# Patient Record
Sex: MALE | Race: WHITE | Employment: FULL TIME | URBAN - NONMETROPOLITAN AREA
[De-identification: names, ages, dates, MRNs, and addresses within clinical notes are randomized per-mention and may not be internally consistent; named-entity substitution may affect disease eponyms.]

---

## 2017-05-09 ENCOUNTER — HOSPITAL ENCOUNTER (OUTPATIENT)
Age: 47
Setting detail: SPECIMEN
Discharge: HOME | End: 2017-05-09

## 2017-05-09 LAB
ALBUMIN SERPL-MCNC: 4.7 G/DL (ref 3.5–5.2)
ALBUMIN/GLOBULIN RATIO: 1.5 (ref 1–2.5)
ALP BLD-CCNC: 61 U/L (ref 40–129)
ALT SERPL-CCNC: 34 U/L (ref 5–41)
ANION GAP SERPL CALCULATED.3IONS-SCNC: 15 MMOL/L (ref 9–17)
AST SERPL-CCNC: 28 U/L
BILIRUB SERPL-MCNC: 0.56 MG/DL (ref 0.3–1.2)
BUN BLDV-MCNC: 23 MG/DL (ref 6–20)
BUN/CREAT BLD: 27 (ref 9–20)
CALCIUM SERPL-MCNC: 9.6 MG/DL (ref 8.6–10.4)
CHLORIDE BLD-SCNC: 97 MMOL/L (ref 98–107)
CHOLESTEROL/HDL RATIO: 2
CHOLESTEROL: 208 MG/DL
CO2: 27 MMOL/L (ref 20–31)
CREAT SERPL-MCNC: 0.85 MG/DL (ref 0.7–1.2)
GFR AFRICAN AMERICAN: >60 ML/MIN
GFR NON-AFRICAN AMERICAN: >60 ML/MIN
GFR SERPL CREATININE-BSD FRML MDRD: ABNORMAL ML/MIN/{1.73_M2}
GFR SERPL CREATININE-BSD FRML MDRD: ABNORMAL ML/MIN/{1.73_M2}
GLUCOSE BLD-MCNC: 117 MG/DL (ref 70–99)
HCT VFR BLD CALC: 47.6 % (ref 41–53)
HDLC SERPL-MCNC: 106 MG/DL
HEMOGLOBIN: 15.7 G/DL (ref 13.5–17)
LDL CHOLESTEROL: 96 MG/DL (ref 0–130)
MCH RBC QN AUTO: 30.6 PG (ref 26–34)
MCHC RBC AUTO-ENTMCNC: 33 G/DL (ref 31–37)
MCV RBC AUTO: 92.9 FL (ref 80–100)
PDW BLD-RTO: 14.3 % (ref 12.1–15.2)
PLATELET # BLD: 356 K/UL (ref 140–450)
PMV BLD AUTO: NORMAL FL (ref 6–12)
POTASSIUM SERPL-SCNC: 4.2 MMOL/L (ref 3.7–5.3)
RBC # BLD: 5.12 M/UL (ref 4.5–5.9)
SODIUM BLD-SCNC: 139 MMOL/L (ref 135–144)
TOTAL PROTEIN: 7.8 G/DL (ref 6.4–8.3)
TRIGL SERPL-MCNC: 32 MG/DL
VLDLC SERPL CALC-MCNC: ABNORMAL MG/DL (ref 1–30)
WBC # BLD: 4.9 K/UL (ref 3.5–11)

## 2017-05-09 PROCEDURE — 80053 COMPREHEN METABOLIC PANEL: CPT

## 2017-05-09 PROCEDURE — 85027 COMPLETE CBC AUTOMATED: CPT

## 2017-05-09 PROCEDURE — 80061 LIPID PANEL: CPT

## 2017-05-09 PROCEDURE — G0328 FECAL BLOOD SCRN IMMUNOASSAY: HCPCS

## 2017-05-12 LAB
DATE, STOOL #1: NORMAL
DATE, STOOL #2: NORMAL
DATE, STOOL #3: NORMAL
HEMOCCULT SP1 STL QL: NEGATIVE
HEMOCCULT SP2 STL QL: NORMAL
HEMOCCULT SP3 STL QL: NORMAL
TIME, STOOL #1: NORMAL
TIME, STOOL #2: NORMAL
TIME, STOOL #3: NORMAL

## 2018-05-01 ENCOUNTER — HOSPITAL ENCOUNTER (OUTPATIENT)
Age: 48
Setting detail: SPECIMEN
Discharge: HOME | End: 2018-05-01
Payer: COMMERCIAL

## 2018-05-01 LAB
ALBUMIN SERPL-MCNC: 4.7 G/DL (ref 3.5–5.2)
ALBUMIN/GLOBULIN RATIO: 1.7 (ref 1–2.5)
ALP BLD-CCNC: 57 U/L (ref 40–129)
ALT SERPL-CCNC: 27 U/L (ref 5–41)
ANION GAP SERPL CALCULATED.3IONS-SCNC: 11 MMOL/L (ref 9–17)
AST SERPL-CCNC: 26 U/L
BILIRUB SERPL-MCNC: 0.87 MG/DL (ref 0.3–1.2)
BUN BLDV-MCNC: 24 MG/DL (ref 6–20)
BUN/CREAT BLD: 28 (ref 9–20)
CALCIUM SERPL-MCNC: 9.6 MG/DL (ref 8.6–10.4)
CHLORIDE BLD-SCNC: 97 MMOL/L (ref 98–107)
CHOLESTEROL/HDL RATIO: 2.1
CHOLESTEROL: 177 MG/DL
CO2: 25 MMOL/L (ref 20–31)
CREAT SERPL-MCNC: 0.86 MG/DL (ref 0.7–1.2)
DATE, STOOL #1: NORMAL
DATE, STOOL #2: NORMAL
DATE, STOOL #3: NORMAL
GFR AFRICAN AMERICAN: >60 ML/MIN
GFR NON-AFRICAN AMERICAN: >60 ML/MIN
GFR SERPL CREATININE-BSD FRML MDRD: ABNORMAL ML/MIN/{1.73_M2}
GFR SERPL CREATININE-BSD FRML MDRD: ABNORMAL ML/MIN/{1.73_M2}
GLUCOSE BLD-MCNC: 108 MG/DL (ref 70–99)
HCT VFR BLD CALC: 46 % (ref 40.7–50.3)
HDLC SERPL-MCNC: 83 MG/DL
HEMOCCULT SP1 STL QL: NEGATIVE
HEMOCCULT SP2 STL QL: NORMAL
HEMOCCULT SP3 STL QL: NORMAL
HEMOGLOBIN: 15.9 G/DL (ref 13–17)
LDL CHOLESTEROL: 84 MG/DL (ref 0–130)
MCH RBC QN AUTO: 31.7 PG (ref 25.2–33.5)
MCHC RBC AUTO-ENTMCNC: 34.6 G/DL (ref 28.4–34.8)
MCV RBC AUTO: 91.6 FL (ref 82.6–102.9)
NRBC AUTOMATED: 0 PER 100 WBC
PDW BLD-RTO: 12.3 % (ref 11.8–14.4)
PLATELET # BLD: 328 K/UL (ref 138–453)
PMV BLD AUTO: 9.3 FL (ref 8.1–13.5)
POTASSIUM SERPL-SCNC: 4 MMOL/L (ref 3.7–5.3)
PROSTATE SPECIFIC ANTIGEN: 0.66 UG/L
RBC # BLD: 5.02 M/UL (ref 4.21–5.77)
SODIUM BLD-SCNC: 133 MMOL/L (ref 135–144)
TIME, STOOL #1: NORMAL
TIME, STOOL #2: NORMAL
TIME, STOOL #3: NORMAL
TOTAL PROTEIN: 7.4 G/DL (ref 6.4–8.3)
TRIGL SERPL-MCNC: 49 MG/DL
VLDLC SERPL CALC-MCNC: NORMAL MG/DL (ref 1–30)
WBC # BLD: 5 K/UL (ref 3.5–11.3)

## 2018-05-01 PROCEDURE — 80061 LIPID PANEL: CPT

## 2018-05-01 PROCEDURE — 80053 COMPREHEN METABOLIC PANEL: CPT

## 2018-05-01 PROCEDURE — G0103 PSA SCREENING: HCPCS

## 2018-05-01 PROCEDURE — 82272 OCCULT BLD FECES 1-3 TESTS: CPT

## 2018-05-01 PROCEDURE — 85027 COMPLETE CBC AUTOMATED: CPT

## 2019-04-23 ENCOUNTER — HOSPITAL ENCOUNTER (OUTPATIENT)
Age: 49
Setting detail: SPECIMEN
Discharge: HOME | End: 2019-04-23
Payer: COMMERCIAL

## 2019-04-23 LAB
ALBUMIN SERPL-MCNC: 4.6 G/DL (ref 3.5–5.2)
ALBUMIN/GLOBULIN RATIO: 1.7 (ref 1–2.5)
ALP BLD-CCNC: 60 U/L (ref 40–129)
ALT SERPL-CCNC: 21 U/L (ref 5–41)
ANION GAP SERPL CALCULATED.3IONS-SCNC: 9 MMOL/L (ref 9–17)
AST SERPL-CCNC: 24 U/L
BILIRUB SERPL-MCNC: 0.27 MG/DL (ref 0.3–1.2)
BUN BLDV-MCNC: 25 MG/DL (ref 6–20)
BUN/CREAT BLD: 29 (ref 9–20)
CALCIUM SERPL-MCNC: 9.6 MG/DL (ref 8.6–10.4)
CHLORIDE BLD-SCNC: 103 MMOL/L (ref 98–107)
CHOLESTEROL/HDL RATIO: 2.2
CHOLESTEROL: 153 MG/DL
CO2: 27 MMOL/L (ref 20–31)
CREAT SERPL-MCNC: 0.86 MG/DL (ref 0.7–1.2)
DATE, STOOL #1: NORMAL
DATE, STOOL #2: NORMAL
DATE, STOOL #3: NORMAL
GFR AFRICAN AMERICAN: >60 ML/MIN
GFR NON-AFRICAN AMERICAN: >60 ML/MIN
GFR SERPL CREATININE-BSD FRML MDRD: ABNORMAL ML/MIN/{1.73_M2}
GFR SERPL CREATININE-BSD FRML MDRD: ABNORMAL ML/MIN/{1.73_M2}
GLUCOSE BLD-MCNC: 100 MG/DL (ref 70–99)
HCT VFR BLD CALC: 45.9 % (ref 40.7–50.3)
HDLC SERPL-MCNC: 70 MG/DL
HEMOCCULT SP1 STL QL: NEGATIVE
HEMOCCULT SP2 STL QL: NORMAL
HEMOCCULT SP3 STL QL: NORMAL
HEMOGLOBIN: 15.1 G/DL (ref 13–17)
LDL CHOLESTEROL: 76 MG/DL (ref 0–130)
MCH RBC QN AUTO: 30.8 PG (ref 25.2–33.5)
MCHC RBC AUTO-ENTMCNC: 32.9 G/DL (ref 28.4–34.8)
MCV RBC AUTO: 93.5 FL (ref 82.6–102.9)
NRBC AUTOMATED: 0 PER 100 WBC
PDW BLD-RTO: 12.6 % (ref 11.8–14.4)
PLATELET # BLD: 333 K/UL (ref 138–453)
PMV BLD AUTO: 9.5 FL (ref 8.1–13.5)
POTASSIUM SERPL-SCNC: 4.3 MMOL/L (ref 3.7–5.3)
RBC # BLD: 4.91 M/UL (ref 4.21–5.77)
SODIUM BLD-SCNC: 139 MMOL/L (ref 135–144)
TIME, STOOL #1: 800
TIME, STOOL #2: NORMAL
TIME, STOOL #3: NORMAL
TOTAL PROTEIN: 7.3 G/DL (ref 6.4–8.3)
TRIGL SERPL-MCNC: 33 MG/DL
VLDLC SERPL CALC-MCNC: NORMAL MG/DL (ref 1–30)
WBC # BLD: 5.2 K/UL (ref 3.5–11.3)

## 2019-04-23 PROCEDURE — 85027 COMPLETE CBC AUTOMATED: CPT

## 2019-04-23 PROCEDURE — 80053 COMPREHEN METABOLIC PANEL: CPT

## 2019-04-23 PROCEDURE — 80061 LIPID PANEL: CPT

## 2019-04-23 PROCEDURE — 82272 OCCULT BLD FECES 1-3 TESTS: CPT

## 2019-04-29 ENCOUNTER — HOSPITAL ENCOUNTER (OUTPATIENT)
Age: 49
Discharge: HOME | End: 2019-04-29

## 2019-04-29 ENCOUNTER — HOSPITAL ENCOUNTER (OUTPATIENT)
Dept: GENERAL RADIOLOGY | Age: 49
Discharge: HOME | End: 2019-05-01

## 2019-04-29 ENCOUNTER — HOSPITAL ENCOUNTER (OUTPATIENT)
Age: 49
Discharge: HOME | End: 2019-05-01

## 2019-04-29 DIAGNOSIS — Z00.00 PHYSICAL EXAM: ICD-10-CM

## 2019-04-29 PROCEDURE — 71045 X-RAY EXAM CHEST 1 VIEW: CPT

## 2019-04-29 PROCEDURE — 93005 ELECTROCARDIOGRAM TRACING: CPT

## 2019-04-30 LAB
EKG ATRIAL RATE: 49 BPM
EKG P AXIS: 56 DEGREES
EKG P-R INTERVAL: 160 MS
EKG Q-T INTERVAL: 436 MS
EKG QRS DURATION: 100 MS
EKG QTC CALCULATION (BAZETT): 393 MS
EKG R AXIS: 55 DEGREES
EKG T AXIS: 32 DEGREES
EKG VENTRICULAR RATE: 49 BPM

## 2020-07-21 ENCOUNTER — HOSPITAL ENCOUNTER (OUTPATIENT)
Age: 50
Setting detail: SPECIMEN
Discharge: HOME | End: 2020-07-21
Payer: COMMERCIAL

## 2020-07-21 ENCOUNTER — HOSPITAL ENCOUNTER (OUTPATIENT)
Age: 50
Discharge: HOME | End: 2020-07-21
Payer: COMMERCIAL

## 2020-07-21 LAB
ALBUMIN SERPL-MCNC: 4.8 G/DL (ref 3.5–5.2)
ALBUMIN/GLOBULIN RATIO: 1.7 (ref 1–2.5)
ALP BLD-CCNC: 52 U/L (ref 40–129)
ALT SERPL-CCNC: 27 U/L (ref 5–41)
ANION GAP SERPL CALCULATED.3IONS-SCNC: 10 MMOL/L (ref 9–17)
AST SERPL-CCNC: 33 U/L
BILIRUB SERPL-MCNC: 0.86 MG/DL (ref 0.3–1.2)
BUN BLDV-MCNC: 21 MG/DL (ref 6–20)
BUN/CREAT BLD: 26 (ref 9–20)
CALCIUM SERPL-MCNC: 9.7 MG/DL (ref 8.6–10.4)
CHLORIDE BLD-SCNC: 98 MMOL/L (ref 98–107)
CHOLESTEROL/HDL RATIO: 2
CHOLESTEROL: 190 MG/DL
CO2: 26 MMOL/L (ref 20–31)
CREAT SERPL-MCNC: 0.8 MG/DL (ref 0.7–1.2)
GFR AFRICAN AMERICAN: >60 ML/MIN
GFR NON-AFRICAN AMERICAN: >60 ML/MIN
GFR SERPL CREATININE-BSD FRML MDRD: ABNORMAL ML/MIN/{1.73_M2}
GFR SERPL CREATININE-BSD FRML MDRD: ABNORMAL ML/MIN/{1.73_M2}
GLUCOSE BLD-MCNC: 97 MG/DL (ref 70–99)
HCT VFR BLD CALC: 46.3 % (ref 40.7–50.3)
HDLC SERPL-MCNC: 96 MG/DL
HEMOGLOBIN: 15.3 G/DL (ref 13–17)
LDL CHOLESTEROL: 87 MG/DL (ref 0–130)
MCH RBC QN AUTO: 31.6 PG (ref 25.2–33.5)
MCHC RBC AUTO-ENTMCNC: 33 G/DL (ref 28.4–34.8)
MCV RBC AUTO: 95.7 FL (ref 82.6–102.9)
NRBC AUTOMATED: 0 PER 100 WBC
PDW BLD-RTO: 13 % (ref 11.8–14.4)
PLATELET # BLD: 330 K/UL (ref 138–453)
PMV BLD AUTO: 9.2 FL (ref 8.1–13.5)
POTASSIUM SERPL-SCNC: 4.1 MMOL/L (ref 3.7–5.3)
RBC # BLD: 4.84 M/UL (ref 4.21–5.77)
SODIUM BLD-SCNC: 134 MMOL/L (ref 135–144)
TOTAL PROTEIN: 7.7 G/DL (ref 6.4–8.3)
TRIGL SERPL-MCNC: 37 MG/DL
VLDLC SERPL CALC-MCNC: NORMAL MG/DL (ref 1–30)
WBC # BLD: 5.5 K/UL (ref 3.5–11.3)

## 2020-07-21 PROCEDURE — 85027 COMPLETE CBC AUTOMATED: CPT

## 2020-07-21 PROCEDURE — 80053 COMPREHEN METABOLIC PANEL: CPT

## 2020-07-21 PROCEDURE — 80061 LIPID PANEL: CPT

## 2020-07-21 PROCEDURE — 93005 ELECTROCARDIOGRAM TRACING: CPT | Performed by: NURSE PRACTITIONER

## 2020-07-22 ENCOUNTER — HOSPITAL ENCOUNTER (OUTPATIENT)
Age: 50
Setting detail: SPECIMEN
Discharge: HOME | End: 2020-07-22
Payer: COMMERCIAL

## 2020-07-22 LAB
DATE, STOOL #1: NORMAL
DATE, STOOL #2: NORMAL
DATE, STOOL #3: NORMAL
EKG ATRIAL RATE: 56 BPM
EKG P AXIS: 67 DEGREES
EKG P-R INTERVAL: 162 MS
EKG Q-T INTERVAL: 454 MS
EKG QRS DURATION: 100 MS
EKG QTC CALCULATION (BAZETT): 438 MS
EKG R AXIS: 67 DEGREES
EKG T AXIS: 33 DEGREES
EKG VENTRICULAR RATE: 56 BPM
HEMOCCULT SP1 STL QL: NEGATIVE
HEMOCCULT SP2 STL QL: NORMAL
HEMOCCULT SP3 STL QL: NORMAL
TIME, STOOL #1: NORMAL
TIME, STOOL #2: NORMAL
TIME, STOOL #3: NORMAL

## 2020-07-22 PROCEDURE — 93010 ELECTROCARDIOGRAM REPORT: CPT | Performed by: INTERNAL MEDICINE

## 2020-07-22 PROCEDURE — 82272 OCCULT BLD FECES 1-3 TESTS: CPT

## 2021-05-20 ENCOUNTER — HOSPITAL ENCOUNTER (OUTPATIENT)
Age: 51
Discharge: HOME | End: 2021-05-20
Payer: COMMERCIAL

## 2021-05-20 ENCOUNTER — HOSPITAL ENCOUNTER (OUTPATIENT)
Age: 51
Setting detail: SPECIMEN
Discharge: HOME | End: 2021-05-20

## 2021-05-20 LAB
ALBUMIN SERPL-MCNC: 4.8 G/DL (ref 3.5–5.2)
ALBUMIN/GLOBULIN RATIO: 1.7 (ref 1–2.5)
ALP BLD-CCNC: 67 U/L (ref 40–129)
ALT SERPL-CCNC: 29 U/L (ref 5–41)
ANION GAP SERPL CALCULATED.3IONS-SCNC: 11 MMOL/L (ref 9–17)
AST SERPL-CCNC: 35 U/L
BILIRUB SERPL-MCNC: 1.27 MG/DL (ref 0.3–1.2)
BUN BLDV-MCNC: 24 MG/DL (ref 6–20)
BUN/CREAT BLD: 27 (ref 9–20)
CALCIUM SERPL-MCNC: 9.6 MG/DL (ref 8.6–10.4)
CHLORIDE BLD-SCNC: 100 MMOL/L (ref 98–107)
CHOLESTEROL, FASTING: 193 MG/DL
CHOLESTEROL/HDL RATIO: 2.1
CO2: 26 MMOL/L (ref 20–31)
CREAT SERPL-MCNC: 0.89 MG/DL (ref 0.7–1.2)
DATE, STOOL #1: NORMAL
DATE, STOOL #2: NORMAL
DATE, STOOL #3: NORMAL
EKG ATRIAL RATE: 41 BPM
EKG P AXIS: 50 DEGREES
EKG P-R INTERVAL: 164 MS
EKG Q-T INTERVAL: 514 MS
EKG QRS DURATION: 102 MS
EKG QTC CALCULATION (BAZETT): 424 MS
EKG R AXIS: 55 DEGREES
EKG T AXIS: 37 DEGREES
EKG VENTRICULAR RATE: 41 BPM
GFR AFRICAN AMERICAN: >60 ML/MIN
GFR NON-AFRICAN AMERICAN: >60 ML/MIN
GFR SERPL CREATININE-BSD FRML MDRD: ABNORMAL ML/MIN/{1.73_M2}
GFR SERPL CREATININE-BSD FRML MDRD: ABNORMAL ML/MIN/{1.73_M2}
GLUCOSE BLD-MCNC: 92 MG/DL (ref 70–99)
HCT VFR BLD CALC: 46.6 % (ref 40.7–50.3)
HDLC SERPL-MCNC: 90 MG/DL
HEMOCCULT SP1 STL QL: NEGATIVE
HEMOCCULT SP2 STL QL: NORMAL
HEMOCCULT SP3 STL QL: NORMAL
HEMOGLOBIN: 15.6 G/DL (ref 13–17)
LDL CHOLESTEROL: 97 MG/DL (ref 0–130)
MCH RBC QN AUTO: 31.5 PG (ref 25.2–33.5)
MCHC RBC AUTO-ENTMCNC: 33.5 G/DL (ref 28.4–34.8)
MCV RBC AUTO: 94 FL (ref 82.6–102.9)
NRBC AUTOMATED: 0 PER 100 WBC
PDW BLD-RTO: 12.6 % (ref 11.8–14.4)
PLATELET # BLD: 366 K/UL (ref 138–453)
PMV BLD AUTO: 9.3 FL (ref 8.1–13.5)
POTASSIUM SERPL-SCNC: 4.1 MMOL/L (ref 3.7–5.3)
PROSTATE SPECIFIC ANTIGEN: 0.57 UG/L
RBC # BLD: 4.96 M/UL (ref 4.21–5.77)
SODIUM BLD-SCNC: 137 MMOL/L (ref 135–144)
TIME, STOOL #1: 811
TIME, STOOL #2: NORMAL
TIME, STOOL #3: NORMAL
TOTAL PROTEIN: 7.6 G/DL (ref 6.4–8.3)
TRIGLYCERIDE, FASTING: 31 MG/DL
VLDLC SERPL CALC-MCNC: NORMAL MG/DL (ref 1–30)
WBC # BLD: 5.4 K/UL (ref 3.5–11.3)

## 2021-05-20 PROCEDURE — 80061 LIPID PANEL: CPT

## 2021-05-20 PROCEDURE — 93010 ELECTROCARDIOGRAM REPORT: CPT | Performed by: INTERNAL MEDICINE

## 2021-05-20 PROCEDURE — 93005 ELECTROCARDIOGRAM TRACING: CPT | Performed by: NURSE PRACTITIONER

## 2021-05-20 PROCEDURE — 80053 COMPREHEN METABOLIC PANEL: CPT

## 2021-05-20 PROCEDURE — G0103 PSA SCREENING: HCPCS

## 2021-05-20 PROCEDURE — 85027 COMPLETE CBC AUTOMATED: CPT

## 2021-05-20 PROCEDURE — 82272 OCCULT BLD FECES 1-3 TESTS: CPT

## 2021-06-16 ENCOUNTER — HOSPITAL ENCOUNTER (OUTPATIENT)
Dept: NON INVASIVE DIAGNOSTICS | Age: 51
Discharge: HOME | End: 2021-06-16
Payer: COMMERCIAL

## 2021-06-16 DIAGNOSIS — Z02.1 PRE-EMPLOYMENT EXAMINATION: ICD-10-CM

## 2021-06-16 PROCEDURE — 93017 CV STRESS TEST TRACING ONLY: CPT

## 2021-06-17 NOTE — PROCEDURES
[facility] [address]                              CARDIAC STRESS TEST    PATIENT NAME: Marbella Obrien                     :        1970  MED REC NO:   [id number]                              ROOM:  ACCOUNT NO:   [de-identified]                           ADMIT DATE: 2021  PROVIDER:     Pamela Cantu    CARDIOVASCULAR DIAGNOSTIC DEPARTMENT    DATE OF STUDY:  2021    ORDERING PROVIDER:  Mendy Norris CNP    PRIMARY CARE PROVIDER:  Mitul Hernandez DO    INTERPRETING PHYSICIAN:  Sancho Oneill. Lyly Borges MD    EXERCISE STRESS TEST REPORT    Stress, exercise stress. INDICATIONS:  Evaluation for employment. CLINICAL HISTORY:  The patient is a 53-year-old man with no known  coronary artery disease. Previous cardiac history includes:  Stress test.    Other previous history includes:  None. Symptoms just prior to testing include:  None. Relevant medications:  None. PROCEDURE:  The patient performed treadmill exercise using a Darrel  protocol, completing 12:38 minutes and completing an estimated workload  of 23.70 metabolic equivalents (METS). The test was terminated due to fatigue and knee and foot pain. The heart rate was 61 beats per minute at baseline and increased to 160  beats at peak exercise, which was 93% of the maximum predicted heart  rate. The rest blood pressure was 120/70 mm/Hg and increased to 174/60  mm/Hg, which is a normal response. During the procedure, the patient  developed fatigue and leg fatigue, but denied chest discomfort. STRESS ECG RESULTS:  The resting electrocardiogram demonstrated normal  sinus rhythm without definitive ST-segment abnormalities suggestive of  myocardial ischemia.     At peak exercise and during recovery, the patient developed:    No significant ST segment changes suggestive of myocardial ischemia with  no premature atrial contractions (PACs) and no premature ventricular  contractions (PVCs). IMPRESSION:  No significant electrocardiographic evidence of myocardial ischemia  during EKG monitoring without significant associated arrhythmias. The patient's Duke Treadmill score is 12 which correlates with a low  risk for significant coronary artery disease. GENOVEVA Muñoz    D: 06/17/2021 9:14:22       T: 06/17/2021 9:18:20     CARRIE/NERI  Job#: [id number]     Doc#: Unknown    CC:  Rudy Johnson

## 2021-12-30 ENCOUNTER — HOSPITAL ENCOUNTER (EMERGENCY)
Age: 51
Discharge: HOME | End: 2021-12-30
Payer: COMMERCIAL

## 2021-12-30 VITALS
BODY MASS INDEX: 27.98 KG/M2 | DIASTOLIC BLOOD PRESSURE: 75 MMHG | RESPIRATION RATE: 16 BRPM | WEIGHT: 195 LBS | HEART RATE: 65 BPM | TEMPERATURE: 96.8 F | SYSTOLIC BLOOD PRESSURE: 118 MMHG | OXYGEN SATURATION: 98 %

## 2021-12-30 DIAGNOSIS — K40.90 LEFT INGUINAL HERNIA: Primary | ICD-10-CM

## 2021-12-30 PROCEDURE — 99282 EMERGENCY DEPT VISIT SF MDM: CPT

## 2021-12-30 ASSESSMENT — ENCOUNTER SYMPTOMS
EYES NEGATIVE: 1
RESPIRATORY NEGATIVE: 1
ABDOMINAL PAIN: 1

## 2021-12-30 ASSESSMENT — PAIN DESCRIPTION - ORIENTATION: ORIENTATION: RIGHT

## 2021-12-30 ASSESSMENT — PAIN SCALES - WONG BAKER: WONGBAKER_NUMERICALRESPONSE: 0

## 2021-12-30 ASSESSMENT — PAIN DESCRIPTION - LOCATION: LOCATION: ABDOMEN

## 2021-12-30 NOTE — ED PROVIDER NOTES
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 Erick Heaton is a53 y.o. 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 He is here for evaluation. Patient states he had a similar incident occur on the right side. He denies any difficulty with urination, constipation nausea or vomiting. Abdomen is otherwise soft nontender palpation          Nursing Notes werereviewed. REVIEW OF SYSTEMS    (2-9 systems for level 4, 10 or more for level 5)     Review of Systems   Constitutional: Negative. HENT: Negative. Eyes: Negative. Respiratory: Negative. Cardiovascular: Negative. Gastrointestinal: Positive for abdominal pain. Endocrine: Negative. Genitourinary: Negative. Musculoskeletal: Negative. Neurological: Negative. Psychiatric/Behavioral: Negative. All other systems reviewed and are negative. Except as noted above the remainder of the review of systems was reviewed and negative. PAST MEDICAL HISTORY   History reviewed. No pertinent past medical history. SURGICALHISTORY       Past Surgical History:   Procedure Laterality Date    HAND SURGERY      HERNIA REPAIR  2021    SMALL INTESTINE SURGERY      VASECTOMY           CURRENT MEDICATIONS       Previous Medications    No medications on file         ALLERGIES   Codeine    FAMILY HISTORY     History reviewed. No pertinent family history.        SOCIAL HISTORY       Social History     Socioeconomic History    Marital status:      Spouse name: None    Number of children: None    Years of education: None    Highest education level: None   Occupational History    None   Tobacco Use    Smoking status: Never Smoker    Smokeless tobacco: None   Substance and Sexual Activity    Alcohol use: Yes     Alcohol/week: 12.0 standard drinks     Types: 12 Cans of beer per week    Drug use: None    Sexual activity: None   Other Topics Concern    None   Social History Narrative    None     Social Determinants of Health     Financial Resource Strain:     Difficulty of Paying Living Expenses: Not on file   Food Insecurity:     Worried About Running Out of Food in the Last Year: Not on file    Bernadette of Food in the Last Year: Not on file   Transportation Needs:     Lack of Transportation (Medical): Not on file    Lack of Transportation (Non-Medical):  Not on file   Physical Activity:     Days of Exercise per Week: Not on file    Minutes of Exercise per Session: Not on file   Stress:     Feeling of Stress : Not on file   Social Connections:     Frequency of Communication with Friends and Family: Not on file    Frequency of Social Gatherings with Friends and Family: Not on file    Attends Islam Services: Not on file    Active Member of [address] [place] [address] [employer] or Organizations: Not on file    Attends Club or Organization Meetings: Not on file    Marital Status: Not on file   Intimate Partner Violence:     Fear of Current or Ex-Partner: Not on file    Emotionally Abused: Not on file    Physically Abused: Not on file    Sexually Abused: Not on file   Housing Stability:     Unable to Pay for Housing in the Last Year: Not on file    Number of [place] in the Last Year: Not on file    Unstable Housing in the Last Year: Not on file       SCREENINGS             PHYSICAL EXAM    (up to 7 for level 4, 8 or more for level 5)     ED Triage Vitals   BP Temp Temp Source Pulse Resp SpO2 Height Weight   12/30/21 1022 12/30/21 1021 12/30/21 1021 12/30/21 1021 12/30/21 1021 12/30/21 1021 -- 12/30/21 1021   118/75 96.8 °F (36 °C) Tympanic 65 16 98 %  195 lb (88.5 kg)       Physical Exam  Vitals and nursing note reviewed. Constitutional:       Appearance: Normal appearance. HENT:      Head: Normocephalic and atraumatic. Right Ear: Tympanic membrane normal.      Left Ear: Tympanic membrane normal.      Nose: Nose normal.      Mouth/Throat:      Mouth: Mucous membranes are dry. Pharynx: Oropharynx is clear. Eyes:      Extraocular Movements: Extraocular movements intact. Conjunctiva/sclera: Conjunctivae normal.      Pupils: Pupils are equal, round, and reactive to light. Cardiovascular:      Rate and Rhythm: Normal rate and regular rhythm. Pulmonary:      Effort: Pulmonary effort is normal.      Breath sounds: Normal breath sounds. Abdominal:      General: Abdomen is flat. Bowel sounds are normal.      Palpations: Abdomen is soft. Hernia: A hernia is present. Comments: Reducible left inguinal hernia no testicular pain or swelling   Musculoskeletal:         General: Normal range of motion. Cervical back: Normal range of motion and neck supple. Skin:     General: Skin is warm and dry. Capillary Refill: Capillary refill takes 2 to 3 seconds. Neurological:      General: No focal deficit present. Mental Status: He is alert and oriented to person, place, and time. Mental status is at baseline. Psychiatric:         Mood and Affect: Mood normal.         Behavior: Behavior normal.         Thought Content:  Thought content normal.         Judgment: Judgment normal.         DIAGNOSTIC RESULTS     EKG: All EKG's are interpreted by the Emergency Department Physician who either signs orCo-signs this chart in the absence of a cardiologist.      RADIOLOGY:   Non-plainfilm images such as CT, Ultrasound and MRI are read by the radiologist. Clara Whitley radiographic images are visualized and preliminarily interpreted by the emergency physician with the below findings:      Interpretationper the Radiologist below, if available at the time of this note:    No orders to display         ED BEDSIDE ULTRASOUND:   Performed by ED Physician - none    LABS:  Labs Reviewed   URINE DRUG SCREEN       All other labs were within normal range or not returned as of this dictation. EMERGENCY DEPARTMENT COURSE and DIFFERENTIAL DIAGNOSIS/MDM:   Vitals:    Vitals:    12/30/21 1021 12/30/21 1022   BP:  118/75   Pulse: 65    Resp: 16    Temp: 96.8 °F (36 °C)    TempSrc: Tympanic    SpO2: 98%    Weight: 195 lb (88.5 kg)          MDM  Number of Diagnoses or Management Options  Left inguinal hernia  Diagnosis management comments: Patient's physical exam is consistent with a nonincarcerated left inguinal hernia easily reduced. Patient has had this in the past.  We discussed reasons to return to the emergency room. He will be referred to occupational medicine as this occurred while at work. Patient will also be referred to Dr. Samra Navarro who repaired his right inguinal hernia. Patient denied the need for pain medicines. Procedures    FINAL IMPRESSION      1.  Left inguinal hernia        DISPOSITION/PLAN   DISPOSITION Decision To Discharge 12/30/2021 11:20:44 AM      PATIENT REFERRED TO:  [place] [id number] [facility]  [phone]  In 2 days      Shahrzad Sluder  [address] [facility] [address] [phone]    In 3 days        DISCHARGE MEDICATIONS:  New Prescriptions    No medications on file              Summation      Patient Course:      ED Medications administered this visit:  Medications - No data to display    New Prescriptions from this visit:    New Prescriptions    No medications on file       Follow-up:  [address] [address]  [phone]  In 2 days      [facility] [address] [facility]  [phone]    In 3 days          Final Impression:   1.  Left inguinal hernia               (Please note that portions of this note were completed with a voice recognition program.  Efforts were made to edit the dictations but occasionally words are mis-transcribed.)         Christina Calderon PA-C  12/30/21 1123

## 2021-12-30 NOTE — ED NOTES
Patient aware we do not currently have anyone available to complete urine drug screen. Writer called occupational health to verify they were able to complete drug screen and they confirmed they could. Patient aware.       Octavio Jc RN  12/30/21 [id number]

## 2022-04-18 ENCOUNTER — HOSPITAL ENCOUNTER (OUTPATIENT)
Age: 52
Setting detail: SPECIMEN
Discharge: HOME | End: 2022-04-18

## 2022-04-18 ENCOUNTER — HOSPITAL ENCOUNTER (OUTPATIENT)
Age: 52
Discharge: HOME | End: 2022-04-18

## 2022-04-18 LAB
ALBUMIN SERPL-MCNC: 4.8 G/DL (ref 3.5–5.2)
ALBUMIN/GLOBULIN RATIO: 1.8 (ref 1–2.5)
ALP BLD-CCNC: 67 U/L (ref 40–129)
ALT SERPL-CCNC: 24 U/L (ref 5–41)
ANION GAP SERPL CALCULATED.3IONS-SCNC: 9 MMOL/L (ref 9–17)
AST SERPL-CCNC: 22 U/L
BILIRUB SERPL-MCNC: 0.41 MG/DL (ref 0.3–1.2)
BUN BLDV-MCNC: 12 MG/DL (ref 6–20)
BUN/CREAT BLD: 13 (ref 9–20)
CALCIUM SERPL-MCNC: 9.5 MG/DL (ref 8.6–10.4)
CHLORIDE BLD-SCNC: 100 MMOL/L (ref 98–107)
CHOLESTEROL/HDL RATIO: 2.4
CHOLESTEROL: 181 MG/DL
CO2: 29 MMOL/L (ref 20–31)
CREAT SERPL-MCNC: 0.9 MG/DL (ref 0.7–1.2)
DATE, STOOL #1: NORMAL
EKG ATRIAL RATE: 56 BPM
EKG P AXIS: 61 DEGREES
EKG P-R INTERVAL: 154 MS
EKG Q-T INTERVAL: 452 MS
EKG QRS DURATION: 100 MS
EKG QTC CALCULATION (BAZETT): 436 MS
EKG R AXIS: 39 DEGREES
EKG T AXIS: 37 DEGREES
EKG VENTRICULAR RATE: 56 BPM
GFR AFRICAN AMERICAN: >60 ML/MIN
GFR NON-AFRICAN AMERICAN: >60 ML/MIN
GFR SERPL CREATININE-BSD FRML MDRD: NORMAL ML/MIN/{1.73_M2}
GFR SERPL CREATININE-BSD FRML MDRD: NORMAL ML/MIN/{1.73_M2}
GLUCOSE BLD-MCNC: 97 MG/DL (ref 70–99)
HCT VFR BLD CALC: 45.3 % (ref 40.7–50.3)
HDLC SERPL-MCNC: 77 MG/DL
HEMOCCULT SP1 STL QL: NEGATIVE
HEMOGLOBIN: 15 G/DL (ref 13–17)
LDL CHOLESTEROL: 96 MG/DL (ref 0–130)
MCH RBC QN AUTO: 31.2 PG (ref 25.2–33.5)
MCHC RBC AUTO-ENTMCNC: 33.1 G/DL (ref 28.4–34.8)
MCV RBC AUTO: 94.2 FL (ref 82.6–102.9)
NRBC AUTOMATED: 0 PER 100 WBC
PDW BLD-RTO: 12.8 % (ref 11.8–14.4)
PLATELET # BLD: 309 K/UL (ref 138–453)
PMV BLD AUTO: 9.4 FL (ref 8.1–13.5)
POTASSIUM SERPL-SCNC: 4.4 MMOL/L (ref 3.7–5.3)
PROSTATE SPECIFIC ANTIGEN: 0.65 UG/L
RBC # BLD: 4.81 M/UL (ref 4.21–5.77)
SODIUM BLD-SCNC: 138 MMOL/L (ref 135–144)
TIME, STOOL #1: 815
TOTAL PROTEIN: 7.5 G/DL (ref 6.4–8.3)
TRIGL SERPL-MCNC: 42 MG/DL
WBC # BLD: 4.4 K/UL (ref 3.5–11.3)

## 2022-04-18 PROCEDURE — 80061 LIPID PANEL: CPT

## 2022-04-18 PROCEDURE — 82272 OCCULT BLD FECES 1-3 TESTS: CPT

## 2022-04-18 PROCEDURE — 85027 COMPLETE CBC AUTOMATED: CPT

## 2022-04-18 PROCEDURE — G0103 PSA SCREENING: HCPCS

## 2022-04-18 PROCEDURE — 80053 COMPREHEN METABOLIC PANEL: CPT

## 2024-09-27 ENCOUNTER — HOSPITAL ENCOUNTER
Dept: HOSPITAL 101 - LAB | Age: 54
Discharge: HOME | End: 2024-09-27
Payer: COMMERCIAL

## 2024-09-27 DIAGNOSIS — R53.83: Primary | ICD-10-CM

## 2024-09-27 DIAGNOSIS — R42: ICD-10-CM

## 2024-09-27 DIAGNOSIS — R00.2: ICD-10-CM

## 2024-09-27 LAB
ADD MANUAL DIFF: NO
ALANINE AMINOTRANSFERASE: 35 U/L (ref 16–63)
ALBUMIN GLOBULIN RATIO: 1.3
ALBUMIN LEVEL: 4 G/DL (ref 3.4–5)
ALKALINE PHOSPHATASE: 59 U/L (ref 46–116)
ANION GAP: 9.4
ASPARTATE AMINO TRANSFERASE: 20 U/L (ref 15–37)
BLOOD UREA NITROGEN: 18 MG/DL (ref 7–18)
CALCIUM: 9 MG/DL (ref 8.5–10.1)
CARBON DIOXIDE: 30.4 MMOL/L (ref 21–32)
CHLORIDE: 101 MMOL/L (ref 98–107)
CO2 BLD-SCNC: 30.4 MMOL/L (ref 21–32)
ESTIMATED GFR (AFRICAN AMERICA: >60 (ref 60–?)
ESTIMATED GFR (NON-AFRICAN AME: >60 (ref 60–?)
GLOBULIN: 3.2 G/DL
GLUCOSE BLD-MCNC: 107 MG/DL (ref 74–106)
HCT VFR BLD CALC: 43.8 % (ref 42–54)
HEMATOCRIT: 43.8 % (ref 42–54)
HEMOGLOBIN: 15.2 G/DL (ref 14–18)
IMMATURE GRANULOCYTES ABS AUTO: 0.01 10^3/UL (ref 0–0.03)
IMMATURE GRANULOCYTES PCT AUTO: 0.2 % (ref 0–0.5)
LYMPHOCYTES  ABSOLUTE AUTO: 1.6 10^3/UL (ref 1.2–3.8)
MCV RBC: 92.4 FL (ref 80–94)
MEAN CORPUSCULAR HEMOGLOBIN: 32.1 PG (ref 25.9–34)
MEAN CORPUSCULAR HGB CONC: 34.7 G/DL (ref 29.9–35.2)
MEAN CORPUSCULAR VOLUME: 92.4 FL (ref 80–94)
PLATELET # BLD: 307 10^3/UL (ref 150–450)
PLATELET COUNT: 307 10^3/UL (ref 150–450)
POTASSIUM SERPLBLD-SCNC: 3.8 MMOL/L (ref 3.5–5.1)
POTASSIUM: 3.8 MMOL/L (ref 3.5–5.1)
RED BLOOD COUNT: 4.74 10^6/UL (ref 4.7–6.1)
SODIUM BLD-SCNC: 137 MMOL/L (ref 136–145)
SODIUM: 137 MMOL/L (ref 136–145)
THYROID STIMULATING HORMONE: 1.19 UIU/ML (ref 0.36–3.74)
TOTAL PROTEIN: 7.2 G/DL (ref 6.4–8.2)
WBC # BLD: 5.1 10^3/UL (ref 4–11)
WHITE BLOOD COUNT: 5.1 10^3/UL (ref 4–11)

## 2024-09-27 PROCEDURE — 36415 COLL VENOUS BLD VENIPUNCTURE: CPT

## 2024-09-27 PROCEDURE — 84443 ASSAY THYROID STIM HORMONE: CPT

## 2024-09-27 PROCEDURE — 85025 COMPLETE CBC W/AUTO DIFF WBC: CPT

## 2024-09-27 PROCEDURE — 80053 COMPREHEN METABOLIC PANEL: CPT

## 2024-10-02 ENCOUNTER — HOSPITAL ENCOUNTER
Dept: HOSPITAL 101 - CT | Age: 54
Discharge: HOME | End: 2024-10-02
Payer: COMMERCIAL

## 2024-10-02 DIAGNOSIS — R55: ICD-10-CM

## 2024-10-02 DIAGNOSIS — R51.9: Primary | ICD-10-CM

## 2024-10-02 PROCEDURE — 93242 EXT ECG>48HR<7D RECORDING: CPT

## 2024-10-02 PROCEDURE — 70450 CT HEAD/BRAIN W/O DYE: CPT

## 2024-10-02 NOTE — CT_ITS
The [address] [address] 
     [phone] 
  
  
Patient Name: 
TRISH MINA 
  
MRN: [id number]    YOB: 1970    Sex: M 
Assigned Patient Location: CT 
Current Patient Location: CT 
Accession/Order Number: [id number] 
Exam Date: 10/02/2024  12:35    Report Date: 10/02/2024  13:22 
  
At the request of: 
KATY NOE   
  
Procedure:  CT head/brain wo con 
  
EXAM: CT head/brain wo con  
  
HISTORY: Pressure in head, Pre Syncope 
  
COMPARISON: None.  
  
TECHNIQUE: Axial soft tissue and bone windows through the calvarium with  
coronal and sagittal reformats. CT dose reduction technique was used including  
  
Automated Exposure Control. 
. 
  
Findings: 
  
The paranasal sinuses and mastoid air cells are well aerated. No air-fluid  
levels. 
  
No extra-axial fluid collection. No intra-axial or extra-axial bleed. No mass  
effect or midline shift. The gray-white matter differentiation is preserved.  
The brain parenchymal volume is age appropriate. The ventricles are  
nondilated.  
The basal cisterns are patent. The craniovertebral junction is unremarkable. 
  
ORDER #: [id number] CT/CT head/brain wo con  
IMPRESSION:  
1. No acute intracranial abnormality.  
   
   
Electronically authenticated by: [place] MCKEON   Date: 10/02/2024  13:22

## 2024-10-02 NOTE — XMS_ITS
Comprehensive CCD (C-CDA v2.1)  
  
                           Created on: 2024  
  
  
Trish Mina  
External Reference #: CDR,PersonID:[id number]  
: 1970  
Sex: Male  
  
Demographics  
  
  
                                        Address             [address] [place]  [address]  
   
                                        Home Phone          [phone]  
   
                                        Preferred Language  en  
   
                                        Marital Status        
   
                                        [employer] Affiliation Unknown  
   
                                        Race                White  
   
                                        Ethnic Group        Not  or Lati  
no  
  
  
Author  
  
  
                                        Organization        [facility]  
  
  
Care Team Providers  
  
  
                                Care Team Member Name Role            Phone  
   
                                Kam Garcia  Primary Care Provider [phone]  
   
                                BEENA BAILEY Referring       Unavailable  
   
                                BALL, KAM YANG Primary Care    Unavailable  
   
                                LEO, BEENA Referring       Unavailable  
   
                                BALL, KAM E Primary Care    Unavailable  
   
                                LEO, BEENA Referring       Unavailable  
   
                                BALL, KAM E Primary Care    Unavailable  
   
                                BALL, KAM E Primary Care    Unavailable  
   
                                LEO, BEENA Referring       Unavailable  
   
                                BALL, KAM E Primary Care    Unavailable  
   
                                LEO, BEENA Referring       Unavailable  
   
                                BALL, KAM YANG Primary Care    Unavailable  
   
                                BALL, DR BURNS Admitting       Unavailable  
   
                                BALL, DR BURNS Attending       Unavailable  
   
                                BALL, DR BURNS Admitting       Unavailable  
   
                                BALL, DR BURNS Attending       Unavailable  
   
                                BALL, DR BURNS Consulting      Unavailable  
   
                                NILL, DR ARRIETA Admitting       Unavailable  
   
                                NILL, DR ARRIETA Attending       Unavailable  
   
                                BALL, DR BURNS Primary Care    Unavailable  
   
                                NILL, DR ARRIETA Consulting      Unavailable  
   
                                NILL, DR ARRIETA Admitting       Unavailable  
   
                                NILL, DR ARRIETA Attending       Unavailable  
   
                                NILL, DR ARRIETA Admitting       Unavailable  
   
                                NILL, DR ARRIETA Attending       Unavailable  
   
                                BALL, DR BURNS Primary Care    Unavailable  
   
                                NILL, DR ARRIETA Consulting      Unavailable  
   
                                LONG, SU    Consulting      Unavailable  
   
                                Ball, Kam  Unavailable     [phone]  
  
  
  
Allergies  
  
  
                                                    Allergy   
Classification                          Reported   
Allergen(s)               Allergy Type              Date of   
Onset                     Reaction(s)               Facility  
   
                                                    Opioid Agonists  
(3 sources)         Codeine             Drug Allergy          
5                                       Other (See   
Comments)                               [facility]  
   
                                                      
(9 sources)         Codeine             Drug Allergy          
5                                       Other (See   
Comments)                               [facility]   
[place], [place]  
   
                                                      
(2 sources)  Codeine      Drug Allergy                           [facility]   
Repository  
  
  
  
Medications  
Current Medications  
  
  
  
                      Medication Drug Class(es) Dates      Sig (Normalized) Sig   
(Original)  
   
                                                    benzonatate 200 mg   
oral capsule  
(5 sources)                             Non-narcotic   
Antitussive                             Start:   
2023                              take 1 capsule by   
mouth every eight   
hours                                     
   
                                                    etodolac 500 mg   
oral tablet  
(4 sources)                             Nonsteroidal   
Anti-inflammatory   
Drug                                    Start:   
2023                              take 1 tablet by   
mouth every twelve   
hours                                   Etodolac 500 MG 1   
tablet with food   
Orally Twice a day   
for 30 days 06   
Sep, 2023 Active  
   
                                                    valACYclovir 500 mg   
oral tablet  
(8 sources)                             Herpesvirus   
Nucleoside Analog   
DNA Polymerase   
Inhibitor, Herpes   
Simplex Virus   
Nucleoside Analog   
DNA Polymerase   
Inhibitor, Herpes   
Zoster Virus   
Nucleoside Analog   
DNA Polymerase   
Inhibitor                               Start:   
2024  
End:   
2024                              take 1 tablet by   
mouth once daily                        Valacyclovir   
Active 0 .ROUTE   
.COMPLEX  11:19am Take   
1 tablet by mouth   
once daily  
  
  
  
                                                    Start: 2024  
End: 2024           take 500 mg by mouth once daily Valacyclovir Discontin  
ued 500 MG  
PO   
Daily May 3rd, 2024 12:00am May 3rd,   
2024 4:49pm  
   
                                                            take 1 tablet by jacquelyn  
 once   
daily                                   valACYclovir HCl 500 MG Take 1   
tablet by mouth once daily Active  
  
  
  
                                                    {20 (nirmatrelvir 150 MG Ora  
l   
Tablet) / 10 (ritonavir 100 MG Oral   
Tablet) } Pack [Paxlovid 5-Day]  
(5 sources)                             Start: 2023   take 3 tablets by mo  
uth   
every twelve hours                        
  
  
  
Completed/Discontinued Medications  
  
  
  
                      Medication Drug Class(es) Dates      Sig (Normalized) Sig   
(Original)  
   
                                                    azithromycin 250 mg   
oral tablet  
(5 sources)                             Macrolide   
Antimicrobial                           Start:   
2023                                          Azithromycin 250   
MG as directed   
Orally daily for 5   
days    
Not-Taking  
   
                                                    predniSONE 20 mg   
oral tablet  
(5 sources)                                         Start:   
2023                                          predniSONE 20 MG 1   
tablet Orally tid   
w/ food x 3 days,   
then bid w/ food x   
3 days, then qd w/   
food x 3 days for   
9    
Not-Taking  
  
  
  
Problems  
Active Problems  
  
  
                      Problem Classification Problem    Date       Documented Da  
te Episodic/Chronic  
   
                                                    Abdominal hernia  
(9 sources)                             Unilateral inguinal   
hernia, without   
obstruction or   
gangrene, not   
specified as   
recurrent;   
Translations:   
[Inguinal hernia]                       Onset:   
2022                                          Episodic  
   
                                                    Abdominal pain  
(5 sources)                             Left upper quadrant   
pain; Translations:   
[Left upper   
quadrant pain]                                              Episodic  
   
                                                    Acute bronchitis  
(1 source)                              Acute bronchitis   
due to other   
specified organisms                                         Episodic  
   
                                                    Administrative/social   
admission  
(6 sources)                             Patient encounter   
status;   
Translations:   
[Encounter for   
pre-employment   
examination]                                                Episodic  
   
                                                    Allergic reactions  
(6 sources)                             Allergic contact   
dermatitis due to   
plants, except   
food; Translations:   
[Allergic contact   
dermatitis due to   
plants, except   
food]                                                       Episodic  
   
                                                    Cardiac dysrhythmias  
(1 source)                              Palpitations;   
Translations:   
[Palpitations]                          2024          Episodic  
   
                                                    Conditions associated   
with dizziness or   
vertigo  
(1 source)                              Lightheadedness;   
Translations:   
[Dizziness and   
giddiness]                              2024          Episodic  
   
                                                    Malaise and fatigue  
(1 source)                              Fatigue;   
Translations:   
[Other fatigue]                         2024          Episodic  
   
                                                    Other ear and sense   
organ disorders  
(5 sources)                             Impacted cerumen;   
Translations:   
[Impacted cerumen,   
bilateral]                                                  Episodic  
   
                                                    Other nutritional;   
endocrine; and   
metabolic disorders  
(5 sources)                             Overweight;   
Translations:   
[Overweight]                                                Episodic  
   
                                                    Other screening for   
suspected conditions   
(not mental disorders   
or infectious disease)  
(3 sources)                             Encounter for   
screening for   
malignant neoplasm   
of colon;   
Translations:   
[Special screening   
for malignant   
neoplasms of colon]                                         Episodic  
   
                                                    Sprains and strains  
(2 sources)                             Sprain of right   
rotator cuff   
capsule, initial   
encounter                                                   Episodic  
   
                                                    Unclassified  
(4 sources)                             CONTACT W/AND   
(SUSP) EXPOS   
COVID-19;   
Translations:   
[CONTACT W/AND   
(SUSP) EXPOS   
COVID-19]                               Onset:   
2021                                            
   
                                                    Viral infection  
(7 sources)                             Herpes simplex type   
2 infection;   
Translations:   
[Herpesviral   
infection,   
unspecified]                                                Episodic  
  
  
Past or Other Problems  
  
  
                      Problem Classification Problem    Date       Documented Da  
te Episodic/Chronic  
   
                                                    Other and unspecified   
benign neoplasm  
(1 source)                              Benign lipomatous   
neoplasm of   
spermatic cord;   
Translations: [HECTOR   
LIPOMATOUS NEOP   
SPERMATIC CORD]                         Onset:   
2022                                          Episodic  
   
                                                    Unclassified  
(1 source)                              CONTACT W/AND   
(SUSP) EXPOS   
COVID-19;   
Translations:   
[CONTACT W/AND   
(SUSP) EXPOS   
COVID-19]                               Onset:   
2021                                            
  
  
  
Results  
  
  
                          Test Name    Value        Interpretation Reference   
Range                                   Facility  
   
                                                    Basophils Auto (Bld) [#/Vol]  
on 2024   
   
                                                    Basophils (Bld)   
[#/Vol]         0.0 10 3/uL                     0.0-0.1         [facility]  
   
                                                    Basophils/100 WBC Auto (Bld)  
on 2024   
   
                                                    Basophils/100 WBC   
(Bld)           0.8 %                           0.2-2.0         [facility]  
   
                                                    Eosinophils/100 WBC Auto (Bl  
d)on 2024   
   
                                                    Eosinophils/100 WBC   
(Bld)           2.4 %                           0.9-7.0         [facility]  
   
                                                    Erythrocyte distribution wid  
th Auto (RBC) [Ratio]on 2024   
   
                                                    Erythrocyte   
distribution width   
(RBC) [Ratio]   12.2 %                          11.0-15.0       [facility]  
   
                                                    Estimated glomerular filtrat  
ion rate (GFR) non- Americanon 2024   
   
                                                    GFR/1.73 sq   
M.predicted among   
non-blacks MDRD   
(S/P/Bld) [Vol   
rate/Area]      mL/min/{1.73_m2}                 >=60            [facility]  
   
                                                    Globulin Calc (S) [Mass/Vol]  
on 2024   
   
                                                    Globulin (S)   
[Mass/Vol]      3.2 g/dL                                        [facility]  
   
                                                    Hematocrit Auto (Bld) [Volum  
e fraction]on 2024   
   
                                                    Hematocrit (Bld)   
[Volume fraction] 43.8 %                          42.0-54.0       [facility]  
   
                                                    Hemoglobin [Mass/volume] in   
Bloodon 2024   
   
                                                    Hemoglobin (Bld)   
[Mass/Vol]      15.2 g/dL                       14.0-18.0       [facility]  
   
                                                    Laboratory - Chemistry and C  
hemistry - challengeon 2024   
   
                      Albumin [Mass/Vol] 4.0 g/dL              3.4-5.0    [facility]  
   
                                                    ALP [Catalytic   
activity/Vol]   59 U/L                                    [facility]  
   
                                                    ALT [Catalytic   
activity/Vol]   35 U/L                          16-63           [facility]  
   
                                                    AST [Catalytic   
activity/Vol]   20 U/L                          15-37           [facility]  
   
                      Bilirubin [Mass/Vol] 1.0 mg/dL             0.2-1.0    [facility]  
   
                      Calcium [Mass/Vol] 9.0 mg/dL             8.5-10.1   [facility]  
   
                      Chloride [Moles/Vol] 101 mmol/L                 [facility]  
   
                      CO2 [Moles/Vol] 30.4 mmol/L            21.0-32.0  [facility]  
   
                                                    Creatinine   
[Mass/Vol]      0.95 mg/dL                      0.70-1.30       [facility]  
   
                                                    GFR/1.73 sq   
M.predicted MDRD   
(S/P/Bld) [Vol   
rate/Area]      mL/min/{1.73_m2}                 >=60            [facility]  
   
                      Glucose [Mass/Vol] 107 mg/dL  High            [facility]  
   
                                                    Potassium   
[Moles/Vol]     3.8 mmol/L                      3.5-5.1         [facility]  
   
                      Protein [Mass/Vol] 7.2 g/dL              6.4-8.2    [facility]  
   
                      Sodium [Moles/Vol] 137 mmol/L            136-145    [facility]  
   
                      TSH Qn     1.190 m[IU]/L            0.358-3.740 [facility]  
   
                                                    Urea nitrogen   
[Mass/Vol]      18.0 mg/dL                      7.0-18.0        [facility]  
   
                                                    Urea   
nitrogen/Creatinine   
[Mass ratio]    18.9 mg/mg                                      [facility]  
   
                                                    Laboratory - Hematology and   
Cell countson 2024   
   
                                                    Immature   
granulocytes/100 WBC   
(Bld)           0.2 %                           0.0-0.5         [facility]  
   
                                                    Leukocytes [#/volume] correc  
angy for nucleated erythrocytes in Blood by Automated  
   
counon 2024   
   
                                                    WBC corrected for   
nucl RBC Auto (Bld)   
[#/Vol]         5.1 10 3/uL                     4.0-11.0        [facility]  
   
                                                    Lymphocytes Auto (Bld) [#/Vo  
l]on 2024   
   
                                                    Lymphocytes (Bld)   
[#/Vol]         1.6 10 3/uL                     1.2-3.8         [facility]  
   
                                                    Lymphocytes/100 WBC Auto (Bl  
d)on 2024   
   
                                                    Lymphocytes/100 WBC   
(Bld)           30.6 %                          20.5-60.0       [facility]  
   
                                                    MCH Auto (RBC) [Entitic mass  
]on 2024   
   
                                                    MCH (RBC) [Entitic   
mass]           32.1 pg                         25.9-34.0       [facility]  
   
                                                    MCHC Auto (RBC) [Mass/Vol]on  
 2024   
   
                                                    MCHC (RBC)   
[Mass/Vol]      34.7 g/dL                       29.9-35.2       [facility]  
   
                                                    MCV Auto (RBC) [Entitic vol]  
on 2024   
   
                                                    MCV (RBC) [Entitic   
vol]            92.4 fL                         80.0-94.0       [facility]  
   
                                                    Monocytes Auto (Bld) [#/Vol]  
on 2024   
   
                                                    Monocytes (Bld)   
[#/Vol]         0.5 10 3/uL                     0.3-0.8         [facility]  
   
                                                    Monocytes/100 WBC Auto (Bld)  
on 2024   
   
                                                    Monocytes/100 WBC   
(Bld)           10.7 %                          1.7-12.0        [facility]  
   
                                                    Neutrophils Auto (Bld) [#/Vo  
l]on 2024   
   
                                                    Neutrophils (Bld)   
[#/Vol]         2.8 10 3/uL                     1.4-6.5         [facility]  
   
                                                    Neutrophils/100 WBC Auto (Bl  
d)on 2024   
   
                                                    Neutrophils/100 WBC   
(Bld)           55.3 %                          43.0-75.0       [facility]  
   
                                                    No Panel Informationon    
   
                      Eosinophils # (Auto) 0.1 10 3/uL            0.0-0.7    [facility]  
   
                                                    Immature Granulocyte   
# (Auto)        0.01 10 3/uL                    0.00-0.03       [facility]  
   
                                                    Platelet mean volume Auto (B  
ld) [Entitic vol]on 2024   
   
                                                    Platelet mean volume   
(Bld) [Entitic vol] 8.8 fL          Low             9.5-13.5        [facility]  
   
                                                    Platelets Auto (Bld) [#/Vol]  
on 2024   
   
                                                    Platelets (Bld)   
[#/Vol]         307 10 3/uL                     150-450         [facility]  
   
                                                    RBC Auto (Bld) [#/Vol]on    
   
                      RBC (Bld) [#/Vol] 4.74 10 6/uL            4.70-6.10  [facility]  
   
                                                    Serum or plasma albumin/glob  
ulin mass ratioon 2024   
   
                                                    Albumin/Globulin   
[Mass ratio]    1.3 {ratio}                                     [facility]  
   
                                                    Serum or plasma anion gap de  
terminationon 2024   
   
                                                    Anion gap   
[Moles/Vol]     9.4 mmol/L                                      [facility]  
   
                                                    Physician Referralon   
023   
   
                                        Physician Referral  [phone]  
[id number]  
[id number]:  
127                 Normal                                  [facility]  
   
                                                    Lipid Profileon 2022   
   
                                                    Cholesterol   
[Mass/Vol]      181 mg/dL       Normal          <200            [facility]  
   
                                        Comment on above:   Result Comment:  
Cholesterol Guidelines:  
<200 Desirable  
200-240 Borderline  
>240 Undesirable   
   
                                                            Performed By: #### C  
BC, CP, OBN ####  
[facility]  
[address] [place] [address] [place] [address]  
[phone]  
: Med Nieves MD  
#### PSAS, LIPR ####  
[address] [place] [address]  
[phone]  
: Matty Monterroso MD   
   
                                                    Cholesterol in HDL   
[Mass/Vol]      77 mg/dL        Normal          >40             [facility]  
   
                                        Comment on above:   Result Comment:  
HDL Guidelines:  
<40 Undesirable  
40-59 Borderline  
>59 Desirable   
   
                                                            Performed By: #### LORY CACERES CP, OBN ####  
[facility]  
[address] [place] [address] [place] [address]  
[phone]  
: Med Nieves MD  
#### PSAS, LIPR ####  
[address] [address]  
[phone]  
: Matty Monterroso MD   
   
                                                    Cholesterol in LDL   
[Mass/Vol]      96 mg/dL        Normal          0-130           [facility]  
   
                                        Comment on above:   Result Comment:  
LDL Guidelines:  
<100 Desirable  
100-129 Near to/above Desirable  
130-159 Borderline  
>159 Undesirable  
Direct (measured) LDL and calculated LDL are not interchangeable   
tests.   
   
                                                            Performed By: #### LORY CACERES CP, OBN ####  
[address] [place] [address]  
[phone]  
: Med Nieves MD  
#### PSAS, LIPR ####  
[address] [address]  
[phone]  
: Matty Monterroso MD   
   
                                                    Cholesterol.total/Ch  
olesterol in HDL   
[Mass ratio]    2.4 {ratio}     Normal          <5              [facility]  
   
                                        Comment on above:   Performed By: #### LORY CACERES CP, OBN ####  
[address] [place], [place] [address]83 [phone]  
: Med Nieves MD  
#### PSAS, LIPR ####  
[address] 4984608 [phone]  
: Matty Monterroso MD   
   
                                                    Triglyceride   
[Mass/Vol]      42 mg/dL        Normal          <150            [facility]  
   
                                        Comment on above:   Result Comment:  
Triglyceride Guidelines:  
<150 Desirable  
150-199 Borderline  
200-499 High  
>499 Very high  
Based on AHA Guidelines for fasting triglyceride, 2012.   
   
                                                            Performed By: #### LORY CACERES CP, OBN ####  
[facility]  
[address] [place] [address] [address] [place], [place] [address]83 [phone]  
: Med Nieves MD  
#### PSAS, LIPR ####  
[facility]  
[facility] [place], [place] [address] [phone]  
: Matty Monterroso MD   
   
                                                    PSA, Screeningon 2022   
   
                      Prostatic Spec. Ag 0.65 ug/L  Normal     <4.1       [facility]  
   
                                        Comment on above:   Result Comment: The   
Roche  ECLIA  assay is used. Results   
obtained   
with different assay methods  
cannot be used interchangeably.   
   
                                                            Performed By: #### LORY CACERES CP, OBN ####  
[address]   
[place] [address] [phone]  
: Med Nieves MD  
#### PSAS, LIPR ####  
[facility]  
[facility] [place] [address]  
[phone]  
: Matty Monterroso MD   
   
                                                    CBCon 2022   
   
                                                    Erythrocyte   
distribution width   
(RBC) [Ratio]   12.8 %          Normal          11.8-14.4       [facility]  
   
                                        Comment on above:   Performed By: #### LORY CACERES CP, OBN ####  
[facility]  
[address] [place] [address]   
[place], [place] 7877383 [phone]  
: Med Nieves MD  
#### PSAS, LIPR ####  
[facility]  
[facility] [place] [address]  
[phone]  
: Matty Monterroso MD   
   
                                                    Hematocrit (Bld)   
[Volume fraction] 45.3 %          Normal          40.7-50.3       [facility]  
   
                                        Comment on above:   Performed By: #### C  
BC, CP, OBN ####  
[address] [place], [place] 8460383 [phone]  
: Med Nieves MD  
#### PSAS, LIPR ####  
[address] [id number] [phone]  
: Matty Monterroso MD   
   
                                                    Hemoglobin (Bld)   
[Mass/Vol]      15.0 g/dL       Normal          13.0-17.0       [facility]  
   
                                        Comment on above:   Performed By: #### C  
BC, CP, OBN ####  
[address] [place] [address] [phone]  
: Med Nieves MD  
#### PSAS, LIPR ####  
[address] 4024508 [phone]  
: Matty Monterroso MD   
   
                                                    MCH (RBC) [Entitic   
mass]           31.2 pg         Normal          25.2-33.5       [facility]  
   
                                        Comment on above:   Performed By: #### C  
BEN CACERES, OBN ####  
[address] [place] [address] [phone]  
: Med Nieves MD  
#### PSAS, LIPR ####  
[address] [address]108508 [phone]  
: Matty Monterroso MD   
   
                                                    MCHC (RBC)   
[Mass/Vol]      33.1 g/dL       Normal          28.4-34.8       [facility]  
   
                                        Comment on above:   Performed By: #### C  
BEN CACERES, OBN ####  
[address] [place] [address] [phone]  
: Med Nieves MD  
#### PSAS, LIPR ####  
[address] [place], [place] [address]1008 [phone]  
: Matty Monterroso MD   
   
                                                    MCV (RBC) [Entitic   
vol]            94.2 fL         Normal          82.6-102.9      [facility]  
   
                                        Comment on above:   Performed By: #### C  
BC, CP, OBN ####  
[facility]  
[address] [place] [address] [phone]  
: Med Nieves MD  
#### PSAS, LIPR ####  
[address] [address]  
[phone]  
: Matty Monterroso MD   
   
                      NRBC Automated 0.0 per 100 WBC Normal     0.0        [facility]  
   
                                        Comment on above:   Performed By: #### C  
BC, CP, OBN ####  
[address] [place] [address]683 [phone]  
: Med Nieves MD  
#### PSAS, LIPR ####  
[address] 3667908 [phone]  
: Matty Monterroso MD   
   
                                                    Platelet mean volume   
(Bld) [Entitic vol] 9.4 fL          Normal          8.1-13.5        [facility]  
   
                                        Comment on above:   Performed By: #### C  
BC, CP, OBN ####  
[address] [place] [address]683 [phone]  
: Med Nieves MD  
#### PSAS, LIPR ####  
[address] [address]9908 [phone]  
: Matty Monterroso MD   
   
                                                    Platelets (Bld)   
[#/Vol]         309 10*3/uL     Normal          138-453         [facility]  
   
                                        Comment on above:   Performed By: #### C  
BC, CP, OBN ####  
[facility]  
[address] [place] [address] [address] [place], [place] 7042383 [phone]  
: Med Nieves MD  
#### PSAS, LIPR ####  
[address] [address]  
[phone]  
: Matty Monterroso MD   
   
                      RBC (Bld) [#/Vol] 4.81 10*6/uL Normal     4.21-5.77  [facility]  
   
                                        Comment on above:   Performed By: #### C  
BEN CACERES, OBN ####  
[address] [place] [address] [phone]  
: Med Nieves MD  
#### FARHAD, LIPR ####  
[address] [place], [place] [address] [phone]  
: Matty Monterroso MD   
   
                      WBC (Bld) [#/Vol] 4.4 10*3/uL Normal     3.5-11.3   [facility]  
   
                                        Comment on above:   Performed By: #### C  
BEN CACERES, OBN ####  
[address] [place] [address] [phone]  
: Med Nieves MD  
#### FARHAD, LIPR ####  
[address] [place], [place] [address] [phone]  
: Matty Monterroso MD   
   
                                                    Comp Metabolic Profon 2022   
   
                      (cont.)               [facility]  
   
                                        Comment on above:   Result Comment: Aver  
age GFR for 40-49 years old:  
99 mL/min/1.73sq m  
Chronic Kidney Disease:  
<60 mL/min/1.73sq m  
Kidney failure:  
<15 mL/min/1.73sq m  
eGFR calculated using average adult body mass. Additional eGFR   
calculator  
available at:  
http://[email or web address]/multiple_crcl_.htm   
   
                                                            Performed By: #### C  
BEN CACERES, OBN ####  
[address] [place] [address] [phone]  
: Med Nieves MD  
#### PSAS, LIPR ####  
[facility]  
[address] [place], [place] [address] [phone]  
: Matty Monterroso MD   
   
                      Albumin [Mass/Vol] 4.8 g/dL   Normal     3.5-5.2    [facility]  
   
                                        Comment on above:   Performed By: #### C  
BEN CACERES, OBN ####  
[facility]  
[address]   
[place], [place] [address]83 [phone]  
: Med Nieves MD  
#### PSAS, LIPR ####  
[address] [place], [place] 3239[address] [phone]  
: Matty Monterroso MD   
   
                      Albumin/Glob Ratio 1.8        Normal     1.0-2.5    [facility]  
   
                                        Comment on above:   Performed By: #### C  
BEN CACERES, OBN ####  
[facility]  
[address]   
[place], [place] 487[address]83 [phone]  
: Med Nieves MD  
#### FARHAD, LIPR ####  
[address] [address]5008 [phone]  
: Matty Monterroso MD   
   
                      Alkaline Phos 67 U/L     Normal          [facility]  
   
                                        Comment on above:   Performed By: #### LORY CACERES CP, OBN ####  
[facility]  
[address]   
[place], [place] [address] [phone]  
: Med Nieves MD  
#### FARHAD, LIPR ####  
[address] [address]  
[phone]  
: Matty Monterroso MD   
   
                                                    ALT [Catalytic   
activity/Vol]   24 U/L          Normal          5-41            [facility]  
   
                                        Comment on above:   Performed By: #### LORY CACERES CP, OBN ####  
[facility]  
[address]   
[place], [place] [address] [phone]  
: Med Nieves MD  
#### PSAS, LIPR ####  
[address] [place] [address]  
[phone]  
: Matty Monterroso MD   
   
                                                    Anion gap   
[Moles/Vol]     9 mmol/L        Normal          9-17            [facility]  
   
                                        Comment on above:   Performed By: #### LORY CACERES CP, OBN ####  
[facility]  
[address] [place], [place] 7445183 [phone]  
: Med Nieves MD  
#### PSAS, LIPR ####  
[address] 0713408 [phone]  
: Matty Monterroso MD   
   
                                                    AST [Catalytic   
activity/Vol]   22 U/L          Normal          <40             [facility]  
   
                                        Comment on above:   Performed By: #### LORY CACERES CP, OBN ####  
[facility]  
[address] [place], [place] 6982083 [phone]  
: Med Nieves MD  
#### FARHAD, LIPR ####  
[address] [place], [place] 1635308 [phone]  
: Matty Monterroso MD   
   
                      Bilirubin [Mass/Vol] 0.41 mg/dL Normal     0.3-1.2    [facility]  
   
                                        Comment on above:   Performed By: #### LORY CACERES CP, OBN ####  
[address] [place], [place] 6980083 [phone]  
: Med Nieves MD  
#### FARHAD, LIPR ####  
[address] [address]  
[phone]  
: Matty Monterroso MD   
   
                      BUN/CRE Ratio 13         Normal     9-20       [facility]  
   
                                        Comment on above:   Performed By: #### LORY CACERES CP, OBN ####  
[facility]  
[address] [place] [address] [address] [place], [place] 3308683 [phone]  
: Med Nieves MD  
#### PSAS, LIPR ####  
[address] [address]  
[phone]  
: Matty Monterroso MD   
   
                      Calcium [Mass/Vol] 9.5 mg/dL  Normal     8.6-10.4   [facility]  
   
                                        Comment on above:   Performed By: #### LORY CACERES CP, OBN ####  
[facility]  
[address] [place] [address] [address] [address] [phone]  
: Med Nieves MD  
#### PSAS, LIPR ####  
[address] [place], [place] [address]39[address] [phone]  
: Matty Monterrsoo MD   
   
                      Chloride [Moles/Vol] 100 mmol/L Normal          [facility]  
   
                                        Comment on above:   Performed By: #### C  
BEN CACERES, OBN ####  
[facility]  
[address] [place] [address]   
[place] [address] [phone]  
: Med Nieves MD  
#### PSAS, LIPR ####  
[address] 8893808 [phone]  
: Matty Monterroso MD   
   
                      CO2 [Moles/Vol] 29 mmol/L  Normal     20-31      [facility]  
   
                                        Comment on above:   Performed By: #### LORY CACERES CP, OBN ####  
[facility]  
[address] [place] [address]   
[place] [address]6083 [phone]  
: Med Nieves MD  
#### PSAS, LIPR ####  
[address] 6893908 [phone]  
: Matty Monterroso MD   
   
                                                    Creatinine   
[Mass/Vol]      0.90 mg/dL      Normal          0.70-1.20       [facility]  
   
                                        Comment on above:   Performed By: #### LORY CACERES CP, OBN ####  
[facility]  
[address] [place] [address]   
[place] [address] [phone]  
: Med Nieves MD  
#### PSAS, LIPR ####  
[address] 6[address] [phone]  
: Matty Monterroso MD   
   
                      GFR, Amer >60        Normal     >60        [facility]  
   
                                        Comment on above:   Performed By: #### C  
BC, CP, OBN ####  
[facility]  
[address] [place] [address]   
[place] [address] [phone]  
: Med Nieves MD  
#### PSAS, LIPR ####  
[facility]  
[address] [place], [place] [address]604[address] [phone]  
: Matty Monterroso MD   
   
                      GFR,non  Amer >60        Normal     >60        [facility]  
   
                                        Comment on above:   Performed By: #### C  
BC, CP, OBN ####  
[facility]  
[address] [place] [address] [place] [address] [phone]  
: Med Nieves MD  
#### PSAS, LIPR ####  
[address] [place], [place] [address]656[address] [phone]  
: Matty Monterroso MD   
   
                      Glucose [Mass/Vol] 97 mg/dL   Normal     70-99      [facility]  
   
                                        Comment on above:   Performed By: #### C  
BC, CP, OBN ####  
[address] [place] [address]683 [phone]  
: Med Nieves MD  
#### PSAS, LIPR ####  
[address] 2599708 [phone]  
: Matty Monterroso MD   
   
                                                    Potassium   
[Moles/Vol]     4.4 mmol/L      Normal          3.7-5.3         [facility]  
   
                                        Comment on above:   Performed By: #### C  
BC, CP, OBN ####  
[address] [place] [address] [phone]  
: Med Nieves MD  
#### PSAS, LIPR ####  
[address] [place], [place] 6[address] [phone]  
: Matty Monterroso MD   
   
                      Protein [Mass/Vol] 7.5 g/dL   Normal     6.4-8.3    [facility]  
   
                                        Comment on above:   Performed By: #### C  
BC, CP, OBN ####  
[facility]  
[address] [place] [address] [place] [address] [phone]  
: Med Nieves MD  
#### PSAS, LIPR ####  
[address] [place], [place] [address] [phone]  
: Matty Monterroso MD   
   
                      Sodium [Moles/Vol] 138 mmol/L Normal     135-144    [facility]  
   
                                        Comment on above:   Performed By: #### C  
BEN CACERES, OBN ####  
[address] [place] [address] [phone]  
: Med Nieves MD  
#### PSAS, LIPR ####  
[address] 32[address]308 [phone]  
: Matty Monterroso MD   
   
                      Staging:              Normal                [facility]  
   
                                        Comment on above:   Result Comment: Stag  
e 1: Some kidney damage normal GFR  
Stage 2: Mild kidney damage GFR 60-89  
Stage 3: Moderate kidney damage GFR 30-59  
Stage 4: Severe kidney damage GFR 15-29  
Stage 5: Severe kidney damage GFR <15  
ESRD - chronic treatment by dialysis or transplant   
   
                                                            Performed By: #### LORY CACERES CP, OBN ####  
[address] [place] [address] [phone]  
: Med Nieves MD  
#### PSAS, LIPR ####  
[address] [address]24508 [phone]  
: Matty Monterroso MD   
   
                                                    Urea nitrogen   
[Mass/Vol]      12 mg/dL        Normal          6-20            [facility]  
   
                                        Comment on above:   Performed By: #### C  
BEN CACERES, OBN ####  
[address] [place] [address] [phone]  
: Med Nieves MD  
#### PSAS, LIPR ####  
[address] 8479208 [phone]  
: Matty Monterroso MD   
   
                                                    EKG 12 LeadOrdered By: oRb Grande on 2022   
   
                      Atrial Rate 56                    BPM        [facility]  
Work Phone:   
[phone]  
5  
   
                      P Axis     61                    degrees    [employer]  
Work Phone:   
[phone]  
5  
   
                      P-R Interval 154 ms                           [employer]  
Work Phone:   
[phone]  
   
                      Q-T Interval 452 ms                           [employer]  
Work Phone:   
[phone]  
   
                      QRS Duration 100 ms                           [employer]  
Work Phone:   
[phone]  
5  
   
                                                    QTc Calculation   
(Bazett)        436 ms                                          [employer]  
Work Phone:   
[phone]  
5  
   
                      R Axis     39                    degrees    [employer]  
Work Phone:   
[phone]  
5  
   
                      T Axis     37                    degrees    [employer]  
Work Phone:   
[phone]  
   
                      Ventricular Rate 56                    BPM        [facility]  
Work Phone:   
[phone]  
5  
   
                                                                  [employer]  
Work Phone:   
[phone]  
5  
   
                                                    EKG 12 Leadon 2022   
   
                                                            Sinus bradycardia  
Possible Left atrial   
enlargement  
Borderline ECG  
When compared with ECG   
of 20-MAY-2021 08:48,  
No significant change   
was found  
Confirmed by ADA GRANDE ([id number]) on   
2022 12:59:34 PM                                         [facility]   
RADIOLOGY  
   
                                                            Ada Grande MD   
- 2022 Formatting   
of this note might be   
different from the   
original.  
Sinus bradycardia  
Possible Left atrial   
enlargement  
Borderline ECG  
When compared with ECG   
of 20-MAY-2021 08:48,  
No significant change   
was found  
Confirmed by ADA GRANDE ([id number]) on   
2022 12:59:34 PM                                         [employer]  
Work Phone:   
[phone]  
1  
   
                                                    Occult Blood, Fecalon 2022   
   
                      Occult Blood 1 Negative   Normal     NEG        [facility]  
in   
Hospital  
   
                                        Comment on above:   Performed By: #### C  
BEN CACERES, OBN ####  
[facility]  
[address] [place] [address] [phone]  
: Med Nieves MD  
#### PSAS, LIPR ####  
[facility]  
[address] [place] [address] [phone]  
: Matty Monterroso MD   
   
                      Specimen 1 Date HIDE       Normal                [facility]  
   
                                        Comment on above:   Performed By: #### C  
BEN CACERES, OBN ####  
[facility]  
[address]   
[place], [place] [address] [phone]  
: Med Nieves MD  
#### PSAS, LIPR ####  
[facility]  
[address] [place], [place] [address] [phone]  
: Matty Monterroso MD   
   
                      Specimen 1 Time 0815       Normal                [facility]  
   
                                        Comment on above:   Performed By: #### C  
BEN CACERES, OBN ####  
[facility]  
[address]  
[place], [place] [address]83 [phone]  
: Med Nieves MD  
#### FARHAD, LIPR ####  
[facility]  
[address] [place], [place] [address] [phone]  
: Matty Monterroso MD   
   
                                                    Covid-19 PCR (CVD[facility])on    
   
                                                    SARS-CoV-2   
(COVID-19) RNA   
KRISTAN+probe Ql (Unsp   
spec)           Not detected    Normal          NOT DETECTED    [facility]  
   
                                        Comment on above:   Result Comment: This  
 test is not yet approved or cleared by   
the   
[place] FDA. When there are no FDA-approved or cleared   
tests available, and other criteria are met, FDA can make tests   
available under an emergency access mechanism called an Emergency   
Use Authorization (EUA). The EUA for this test is supported by   
the  of Health and Human Service's (HHS's) declaration   
that circumstances exist to justify the emergency use of in vitro   
diagnostics for the detection and/or diagnosis of the virus that   
causes COVID-19. This EUA will remain in effect (meaning this   
test can be used) for the duration of the COVID-19 declaration   
justifying emergency of IVDs, unless it is terminated or revoked   
by FDA (after which the test may no longer be used).  
When diagnostic testing is negative, the possibility of a false   
negative should be considered in  
the context of a patient's recent exposures and the presence of   
clinical signs and symptoms  
consistent with SARS-CoV-2.   
   
                                                            Performed By: #### C  
VD[facility] ####  
[facility]  
[address] [place], [place] [address]  
Dr. Abbey Zaldivar   
   
                                                    Covid-19 PCR (CVDTBH)on    
   
                                                    SARS-CoV-2   
(COVID-19) RNA   
KRISTAN+probe Ql (Unsp   
spec)           Not detected    Normal          NOT DETECTED    [facility]  
   
                                        Comment on above:   Result Comment: This  
 test is not yet approved or cleared by   
the   
[place] FDA. When there are no FDA-approved or cleared   
tests available, and other criteria are met, FDA can make tests   
available under an emergency access mechanism called an Emergency   
Use Authorization (EUA). The EUA for this test is supported by   
the [place] of Health and Human Service's (HHS's) declaration   
that circumstances exist to justify the emergency use of in vitro   
diagnostics for the detection and/or diagnosis of the virus that   
causes COVID-19. This EUA will remain in effect (meaning this   
test can be used) for the duration of the COVID-19 declaration   
justifying emergency of IVDs, unless it is terminated or revoked   
by FDA (after which the test may no longer be used).  
When diagnostic testing is negative, the possibility of a false   
negative should be considered in  
the context of a patient's recent exposures and the presence of   
clinical signs and symptoms  
consistent with SARS-CoV-2.   
   
                                                            Performed By: #### C  
[facility] ####  
[facility]  
[address] [place] [address]  
Dr. Abbey Zaldivar   
   
                                                    CARDIAC STRESS TESTon 2021   
   
                                        CARDIAC STRESS TEST [place] [id number]  
CARDIAC STRESS TEST  
PATIENT NAME: TRISH MINA : 1970  
MED REC NO: [id number] ROOM:  
ACCOUNT NO: [id number]   
ADMIT DATE: 2021  
PROVIDER: Milton Mckeon  
CARDIOVASCULAR   
DIAGNOSTIC DEPARTMENT  
DATE OF STUDY:   
2021  
ORDERING PROVIDER:   
Beena Bailey CNP  
PRIMARY CARE PROVIDER:   
Kam Garcia DO  
INTERPRETING PHYSICIAN:   
Milton Mckeon MD  
EXERCISE STRESS TEST   
REPORT  
Stress, exercise stress.  
INDICATIONS:  
Evaluation for   
employment.  
CLINICAL HISTORY: The   
patient is a 51-year-old   
man with no known  
coronary artery disease.  
Previous cardiac history   
includes: Stress test.  
Other previous history   
includes: None.  
Symptoms just prior to   
testing include: None.  
Relevant medications:   
None.  
PROCEDURE: The patient   
performed treadmill   
exercise using a Piero  
protocol, completing   
12:38 minutes and   
completing an estimated   
workload  
of 14.50 metabolic   
equivalents (METS).  
The test was terminated   
due to fatigue and knee   
and foot pain.  
The heart rate was 61   
beats per minute at   
baseline and increased   
to 160  
beats at peak exercise,   
which was 93% of the   
maximum predicted heart  
rate. The rest blood   
pressure was 120/70   
mm/Hg and increased to   
174/60  
mm/Hg, which is a normal   
response. During the   
procedure, the patient  
developed fatigue and   
leg fatigue, but denied   
chest discomfort.  
STRESS ECG RESULTS: The   
resting   
electrocardiogram   
demonstrated normal  
sinus rhythm without   
definitive ST-segment   
abnormalities suggestive   
of  
myocardial ischemia.  
At peak exercise and   
during recovery, the   
patient developed:  
No significant ST   
segment changes   
suggestive of myocardial   
ischemia with  
no premature atrial   
contractions (PACs) and   
no premature ventricular  
contractions (PVCs).  
  
IMPRESSION:  
No significant   
electrocardiographic   
evidence of myocardial   
ischemia  
during EKG monitoring   
without significant   
associated arrhythmias.  
The patient's Duke   
Treadmill score is 12   
which correlates with a   
low  
risk for significant   
coronary artery disease.  
MILTON MCKEON  
D: 2021 9:14:22 T:   
2021 9:18:20   
FRANNY/YESSY_TESS  
Job#: [id number] Doc#:   
Unknown  
CC: Kam Bailey Normal                                  [facility]  
   
                                                    Lipid Prof, Fastingon 2021   
   
                                                    Cholesterol   
[Mass/Vol]      193 mg/dL       Normal          <200            [facility]  
   
                                        Comment on above:   Result Comment:  
Cholesterol Guidelines:  
<200 Desirable  
200-240 Borderline  
>240 Undesirable   
   
                                                            Performed By: #### C  
BC, OBN, CP ####  
[facility]  
[address]   
[place] [address] [phone]  
: Med Nieves MD  
#### PSAS, LIPRF ####  
[facility]  
[address] [place] [address] [phone]  
: Matty Monterroso MD   
   
                                                    Cholesterol in HDL   
[Mass/Vol]      90 mg/dL        Normal          >40             [facility]  
   
                                        Comment on above:   Result Comment:  
HDL Guidelines:  
<40 Undesirable  
40-59 Borderline  
>59 Desirable   
   
                                                            Performed By: #### C  
BC, OBN, CP ####  
[facility]  
[address]   
[place] [address] [phone]  
: Med Nieves MD  
#### PSAS, LIPRF ####  
[place] [address] [place] [address]  
[phone]  
: Matty Monterroso MD   
   
                                                    Cholesterol in LDL   
[Mass/Vol]      97 mg/dL        Normal          0-130           [facility]  
   
                                        Comment on above:   Result Comment:  
LDL Guidelines:  
<100 Desirable  
100-129 Near to/above Desirable  
130-159 Borderline  
>159 Undesirable  
Direct (measured) LDL and calculated LDL are not interchangeable   
tests.   
   
                                                            Performed By: #### C  
BC, OBN, CP ####  
[facility]  
[address] [place], [place] [address] [phone]  
: Med Nieves MD  
#### PSAS, LIPRF ####  
[address] [address]  
[phone]  
: Matty Monterroso MD   
   
                                                    Cholesterol.total/Ch  
olesterol in HDL   
[Mass ratio]    2.1 {ratio}     Normal          <5              [facility]  
   
                                        Comment on above:   Performed By: #### C  
BC, OBBIANCA, CP ####  
[facility]  
[address] [place], [place] [address] [phone]  
: Med Nieves MD  
#### PSAS, LIPRF ####  
[address] [address]  
[phone]  
: Matty Monterroso MD   
   
                      Triglyceride,Fasting 31 mg/dL   Normal     <150       [facility]  
   
                                        Comment on above:   Result Comment:  
Triglyceride Guidelines:  
<150 Desirable  
150-199 Borderline  
200-499 High  
>499 Very high  
Based on AHA Guidelines for fasting triglyceride, 2012.   
   
                                                            Performed By: #### C  
BC, OBN, CP ####  
[facility]  
[address] [place] [address] [phone]  
: Med Nieves MD  
#### PSAS, LIPRF ####  
[address] 3922008 [phone]  
: Matty Monterroso MD   
   
                                                    PSA, Screeningon 2021   
   
                      Prostatic Spec. Ag 0.57 ug/L  Normal     <4.1       [facility]  
   
                                        Comment on above:   Result Comment: The   
Roche  ECLIA  assay is used. Results   
obtained   
with different assay methods  
cannot be used interchangeably.   
   
                                                            Performed By: #### C  
BC, OBN, CP ####  
[facility]  
[address] [place] [address] [phone]  
: Med Nieves MD  
#### PSAS, LIPRF ####  
[place] [address] [place], [place] 9098708 [phone]  
: Matty Monterroso MD   
   
                                                    Blood Occult Stool Screen #1  
Ordered By: Beena Bailey on 2021   
   
                      Date, Stool #1 21                          [facility]  
th  
Work Phone:   
[phone]  
1  
   
                      Date, Stool #2 NOT REPORTED                       [facility]  
alth  
Work Phone:   
[phone]  
1  
   
                      Date, Stool #3 NOT REPORTED                       [facility]  
alth  
Work Phone:   
[phone]  
1  
   
                                                    Hemoglobin.gastroint  
estinal spec 1 Ql   
(Stl)           Negative                        NEGATIVE        [facility]  
Work Phone:   
[phone]  
1  
   
                                                    Hemoglobin.gastroint  
estinal spec 2 Ql   
(Stl)           NOT REPORTED                    NEGATIVE        [facility]  
Work Phone:   
[phone]  
1  
   
                                                    Hemoglobin.gastroint  
estinal spec 3 Ql   
(Stl)           NOT REPORTED                    NEGATIVE        [facility]  
Work Phone:   
[phone]  
1  
   
                      Time, Stool #1 811                              [facility]  
th  
Work Phone:   
[phone]  
1  
   
                      Time, Stool #2 NOT REPORTED                       [facility]  
alth  
Work Phone:   
[phone]  
1  
   
                      Time, Stool #3 NOT REPORTED                       [facility]  
alth  
Work Phone:   
[phone]  
1  
   
                                                                  [facility]  
Work Phone:   
[phone]  
1  
   
                                                    CBCon 2021   
   
                                                    Erythrocyte   
distribution width   
(RBC) [Ratio]   12.6 %          Normal          11.8-14.4       [facility]  
   
                                        Comment on above:   Performed By: #### C  
BC, OBN, CP ####  
[facility]  
[address] [place] [address] [phone]  
: Med Nieves MD  
#### PSAS, LIPRF ####  
[address] 5[address]1308 [phone]  
: Matty Monterroso MD   
   
                                                    Hematocrit (Bld)   
[Volume fraction] 46.6 %          Normal          40.7-50.3       [facility]  
   
                                        Comment on above:   Performed By: #### C  
BC, OBBIANCA, CP ####  
[address] [place] [address] [phone]  
: Med Nieves MD  
#### FARHAD, LIPRF ####  
[address] 9983208 [phone]  
: Matty Monterroso MD   
   
                                                    Hemoglobin (Bld)   
[Mass/Vol]      15.6 g/dL       Normal          13.0-17.0       [facility]  
   
                                        Comment on above:   Performed By: #### JESIKA BIRCH, CP ####  
[address] [place] [address] [phone]  
: Med Nieves MD  
#### FARHAD, LIPRF ####  
[address] [address]50508 [phone]  
: Matty Monterroso MD   
   
                                                    MCH (RBC) [Entitic   
mass]           31.5 pg         Normal          25.2-33.5       [facility]  
   
                                        Comment on above:   Performed By: #### C  
BC, OBBIANCA, CP ####  
[address] [place] [address] [phone]  
: Med Nieves MD  
#### FARHAD, LIPRF ####  
[address] 5187608 [phone]  
: Matty Monterroso MD   
   
                                                    MCHC (RBC)   
[Mass/Vol]      33.5 g/dL       Normal          28.4-34.8       [facility]  
   
                                        Comment on above:   Performed By: #### JESIKA BIRCH, CP ####  
[facility]  
[address] [place], [place] [address] [phone]  
: Med Nieves MD  
#### FARHAD, LIPRF ####  
[address] [address] [phone]  
: Matty Monterroso MD   
   
                                                    MCV (RBC) [Entitic   
vol]            94.0 fL         Normal          82.6-102.9      [facility]  
   
                                        Comment on above:   Performed By: #### C  
BC, OBN, CP ####  
[facility]  
[address]  
[place], [place] [address] [phone]  
: Med Nieves MD  
#### FARHAD, LIPRF ####  
[address] 8503808 [phone]  
: Matty Monterroso MD   
   
                      NRBC Automated 0.0 per 100 WBC Normal     0.0        [facility]  
   
                                        Comment on above:   Performed By: #### C  
BC, OBBIANCA, CP ####  
[facility]  
[address] [place] [address] [address]  
[place], [place] [address] [phone]  
: Med Nieves MD  
#### FARHAD, LIPRF ####  
[address] [address]1608 [phone]  
: Matty Monterroso MD   
   
                                                    Platelet mean volume   
(Bld) [Entitic vol] 9.3 fL          Normal          8.1-13.5        [facility]  
   
                                        Comment on above:   Performed By: #### C  
BC, OBN, CP ####  
[facility]  
[address] [place] [address] [address]  
[place], [place] [address] [phone]  
: Med Nieves MD  
#### PSAS, LIPRF ####  
[address] [address]16[address] [phone]  
: Matty Monterroso MD   
   
                                                    Platelets (Bld)   
[#/Vol]         366 10*3/uL     Normal          138-453         [facility]  
   
                                        Comment on above:   Performed By: #### C  
BC, OBN, CP ####  
[facility]  
[address] [place] [address] [phone]  
: Med Nieves MD  
#### PSAS, LIPRF ####  
[place] [address] [place], [place] [address]84[address] [phone]  
: Matty Monterroso MD   
   
                      RBC (Bld) [#/Vol] 4.96 10*6/uL Normal     4.21-5.77  [facility]  
   
                                        Comment on above:   Performed By: #### C  
BC, OBN, CP ####  
[facility]  
[address] [place] [address] [phone]  
: Med Nieves MD  
#### PSAS, LIPRF ####  
[place] [address] [place], [place] [address] [phone]  
: Matty Monterroso MD   
   
                      WBC (Bld) [#/Vol] 5.4 10*3/uL Normal     3.5-11.3   [facility]  
   
                                        Comment on above:   Performed By: #### C  
BC, OBN, CP ####  
[address] [place] [address] [phone]  
: Med Nieves MD  
#### PSAS, LIPRF ####  
[place] [address] [place], [place] [address]28[address] [phone]  
: Matty Monterroso MD   
   
                                                    CBCOrdered By: Beena ragsdale on 2021   
   
                                                    Hematocrit (Bld)   
[Volume fraction] 46.6 %                          40.7 - 50.3 %   [facility]  
[employer] Phone:   
[phone]  
1  
   
                                                    Hemoglobin.gastroint  
estinal spec 1 Ql   
(Stl)               15.6 g/dL                               13.0 - 17.0   
g/dL                                    [facility] [employer] Phone:   
[phone]  
1  
   
                                                    MCH (RBC) [Entitic   
mass]               31.5 pg                                 25.2 - 33.5   
pg                                      [facility]  
Work Phone:   
[phone]  
1  
   
                                                    MCHC (RBC)   
[Mass/Vol]          33.5 g/dL                               28.4 - 34.8   
g/dL                                    [facility] Phone:   
[phone]  
1  
   
                                                    MCV (RBC) [Entitic   
vol]                94.0 fL                                 82.6 - 102.9   
fL                                      [employer] Phone:   
[phone]  
1  
   
                          NRBC Automated 0.0                       0.0 per 100   
WBC                                     [employer] Phone:   
[phone]  
1  
   
                                                    Platelet   
distribution width   
(Bld) [Ratio]   12.6 %                          11.8 - 14.4 %   [facility] Phone:   
[phone]  
1  
   
                                                    Platelet mean volume   
(Bld) [Entitic vol] 9.3 fL                          8.1 - 13.5 fL   [employer] Phone:   
[phone]  
1  
   
                                                    Platelets (Bld)   
[#/Vol]         366 10*3/uL                                     [facility] Phone:   
[phone]  
1  
   
                          RBC (Bld) [#/Vol] 4.96 10*6/uL              4.21 - 5.7  
7   
m/uL                                    [facility] Phone:   
[phone]  
1  
   
                      WBC (Bld) [#/Vol] 5.4 10*3/uL                       [facility] Phone:   
[phone]  
   
                                                                  [employer] Phone:   
[phone]  
1  
   
                                                    Comp Metabolic Profon 2021   
   
                      (cont.)               Normal                [facility]  
   
                                        Comment on above:   Result Comment: Aver  
age GFR for 40-49 years old:  
99 mL/min/1.73sq m  
Chronic Kidney Disease:  
<60 mL/min/1.73sq m  
Kidney failure:  
<15 mL/min/1.73sq m  
eGFR calculated using average adult body mass. Additional eGFR   
calculator  
available at:  
http://www.[employer]book[employer].Com.com/multiple_crcl_.htm   
   
                                                            Performed By: #### C  
BC, OBN, CP ####  
[facility]  
[address] [place] [address] [phone]  
: Med Nieves MD  
#### EITAN LLAMAS ####  
[facility]  
[address] [place] [address] [phone]  
: Matty Monterroso MD   
   
                      Albumin [Mass/Vol] 4.8 g/dL   Normal     3.5-5.2    [facility]  
   
                                        Comment on above:   Performed By: #### C  
BC, OBN, CP ####  
[facility]  
[address] [place], [place] [address] [phone]  
: Med Nieves MD  
#### PSAS, LIPRF ####  
[address] [address]  
[phone]  
: Matty Monterroso MD   
   
                      Albumin/Glob Ratio 1.7        Normal     1.0-2.5    [facility]  
   
                                        Comment on above:   Performed By: #### C  
BC, OBBIANCA, CP ####  
[facility]  
[address] [place], [place] [address] [phone]  
: Med Nieves MD  
#### PSAS, LIPRF ####  
[address] [address]  
[phone]  
: Matty Monterroso MD   
   
                      Alkaline Phos 67 U/L     Normal          [facility]  
   
                                        Comment on above:   Performed By: #### C  
JESIKA CACERES, CP ####  
[facility]  
[address] [place] [address] [place] [address]  
[phone]  
: Med Nieves MD  
#### FARHAD, LIPRF ####  
[address] [address]  
[phone]  
: Matty Monterroso MD   
   
                                                    ALT [Catalytic   
activity/Vol]   29 U/L          Normal          5-41            [facility]  
   
                                        Comment on above:   Performed By: #### C  
BC, OBBIANCA, CP ####  
[facility]  
[address] [place] [address]  
[phone]  
: Med Nieves MD  
#### PSAS, LIPRF ####  
[facility]  
[address] [place] [address]  
[phone]  
: Matty Monterroso MD   
   
                                                    Anion gap   
[Moles/Vol]     11 mmol/L       Normal          9-17            [facility]  
   
                                        Comment on above:   Performed By: #### C  
JSEIKA CACERES, CP ####  
[facility]  
[address] [place], [place] [address] [phone]  
: Med Nieves MD  
#### PSAS, LIPRF ####  
[address] [address]  
[phone]  
: Matty Monterroso MD   
   
                                                    AST [Catalytic   
activity/Vol]   35 U/L          Normal          <40             [facility]  
   
                                        Comment on above:   Performed By: #### C  
JESIKA CACERES, CP ####  
[facility]  
[address] [place], [place] 84[address] [phone]  
: Med Nieves MD  
#### FARHAD, LIPRF ####  
[address] [address]  
[phone]  
: Matty Monterroso MD   
   
                      Bilirubin [Mass/Vol] 1.27 mg/dL High       0.3-1.2    [facility]  
   
                                        Comment on above:   Performed By: #### JESIKA BIRCH, CP ####  
[facility]  
[address] [place], [place] [address] [phone]  
: Med Nieves MD  
#### FARHAD, LIPRF ####  
[address] [address]  
[phone]  
: Matty Monterroso MD   
   
                      BUN/CRE Ratio 27         High       9-20       [facility]  
   
                                        Comment on above:   Performed By: #### C  
JESIKA CACERES, CP ####  
[facility]  
[address] [place], [place] [address] [phone]  
: Med Nieves MD  
#### PSAS, LIPRF ####  
[address] [address]  
[phone]  
: Matty Monterroso MD   
   
                      Calcium [Mass/Vol] 9.6 mg/dL  Normal     8.6-10.4   [facility]  
   
                                        Comment on above:   Performed By: #### C  
BC, OBN, CP ####  
[facility]  
[address] [place], [place] [address] [phone]  
: Med Nieves MD  
#### PSAS, LIPRF ####  
[address] [address] [phone]  
: Matty Monterroso MD   
   
                      Chloride [Moles/Vol] 100 mmol/L Normal          [facility]  
   
                                        Comment on above:   Performed By: #### C  
BC, OBN, CP ####  
[facility]  
[address] [place] [address] [address] [place], [place] [address] [phone]  
: Med Nieves MD  
#### PSAS, LIPRF ####  
[address] 1849408 [phone]  
: Matty Monterroso MD   
   
                      CO2 [Moles/Vol] 26 mmol/L  Normal     20-31      [facility]  
   
                                        Comment on above:   Performed By: #### C  
BC, OBBIANCA, CP ####  
[facility]  
[address] [place] [address] [address] [place], [place] 1236[address] [phone]  
: Med Nieves MD  
#### PSAS, LIPRF ####  
[address] [address] [phone]  
: Matty Monterroso MD   
   
                                                    Creatinine   
[Mass/Vol]      0.89 mg/dL      Normal          0.70-1.20       [facility]  
   
                                        Comment on above:   Performed By: #### C  
JESIKA CACERES, CP ####  
[facility]  
[address] [place] [address] [phone]  
: Med Nieves MD  
#### PSAS, LIPRF ####  
[address] [address]  
[phone]  
: Matty Monterroso MD   
   
                      GFR, Amer >60        Normal     >60        [facility]  
   
                                        Comment on above:   Performed By: #### C  
BC, OBN, CP ####  
[facility]  
[address] [place] [address] [phone]  
: Med Nieves MD  
#### PSAS, LIPRF ####  
[address] [place], [place] [address] [phone]  
: Matty Monterroso MD   
   
                      GFR,non  Amer >60        Normal     >60        [facility]  
   
                                        Comment on above:   Performed By: #### C  
BC, OBN, CP ####  
[facility]  
[address] [place] [address] [phone]  
: Med Nieves MD  
#### PSAS, LIPRF ####  
[address] [address] [phone]  
: Matty Monterroso MD   
   
                      Glucose [Mass/Vol] 92 mg/dL   Normal     70-99      [facility]  
   
                                        Comment on above:   Performed By: #### C  
BC, OBN, CP ####  
[facility]  
[address] [place] [address] [phone]  
: Med Nieves MD  
#### PSAS, LIPRF ####  
[address] [address] [phone]  
: Matty Monterroso MD   
   
                                                    Potassium   
[Moles/Vol]     4.1 mmol/L      Normal          3.7-5.3         [facility]  
   
                                        Comment on above:   Performed By: #### C  
BC, OBN, CP ####  
[facility]  
[address] [place] [address] [phone]  
: Med Nieves MD  
#### PSAS, LIPRF ####  
[address] [address]19[address] [phone]  
: Matty Monterroso MD   
   
                      Protein [Mass/Vol] 7.6 g/dL   Normal     6.4-8.3    [facility]  
   
                                        Comment on above:   Performed By: #### C  
BC, OBBIANCA, CP ####  
[facility]  
[address] [place], [place] [address] [phone]  
: Med Nieves MD  
#### PSAS, LIPRF ####  
[facility]  
[address] [place] [address]  
[phone]  
: Matty Monterroso MD   
   
                      Sodium [Moles/Vol] 137 mmol/L Normal     135-144    [facility]  
   
                                        Comment on above:   Performed By: #### C  
JESIKA CACERES, CP ####  
[address] [place] [address] [phone]  
: Med Nieves MD  
#### FARHAD, LIPRF ####  
[address] [address]  
[phone]  
: Matty Monterroso MD   
   
                      Staging:              Normal                [facility]  
   
                                        Comment on above:   Result Comment: Stag  
e 1: Some kidney damage normal GFR  
Stage 2: Mild kidney damage GFR 60-89  
Stage 3: Moderate kidney damage GFR 30-59  
Stage 4: Severe kidney damage GFR 15-29  
Stage 5: Severe kidney damage GFR <15  
ESRD - chronic treatment by dialysis or transplant   
   
                                                            Performed By: #### C  
JESIKA CACERES, CP ####  
[address] [place], [place] [address] [phone]  
: Med Nieves MD  
#### PSAS, LIPRF ####  
[facility]  
[address] [place] [address]  
[phone]  
: Matty Monterroso MD   
   
                                                    Urea nitrogen   
[Mass/Vol]      24 mg/dL        High            6-20            [facility]  
   
                                        Comment on above:   Performed By: #### C  
JESIKA CACERES, CP ####  
[address] [place], [place] [address] [phone]  
: Med Nieves MD  
#### PSAS, LIPRF ####  
[address] [address]  
[phone]  
: Matty Monterroso MD   
   
                                                    Comprehensive Metabolic Pane  
lOrdered By: Beena Bailey on 2021   
   
                          Albumin [Mass/Vol] 4.8 g/dL                  3.5 - 5.2  
   
g/dL                                    [facility] Phone:   
[phone]  
1  
   
                                                    Albumin/Globulin   
[Mass ratio]    1.7 {ratio}                                     [facility] Phone:   
[phone]  
1  
   
                                                    ALP (Bld) [Catalytic   
activity/Vol]   67 U/L                          40 - 129 U/L    [facility] Phone:   
[phone]  
1  
   
                                                    ALT [Catalytic   
activity/Vol]   29 U/L                          5 - 41 U/L      [facility] Phone:   
[phone]  
1  
   
                                                    Anion gap   
[Moles/Vol]     11 mmol/L                       9 - 17 mmol/L   [facility] Phone:   
[phone]  
1  
   
                                                    AST [Catalytic   
activity/Vol]   35 U/L                          <40             [facility] Phone:   
[phone]  
1  
   
                          Bilirubin [Mass/Vol] 1.27 mg/dL   High         0.3 - 1  
.2   
mg/dL                                   [facility] Phone:   
[phone]  
1  
   
                          Calcium [Mass/Vol] 9.6 mg/dL                 8.6 - 10.  
4   
mg/dL                                   [facility] Phone:   
[phone]  
1  
   
                          Chloride [Moles/Vol] 100 mmol/L                98 - 10  
7   
mmol/L                                  [facility] Phone:   
[phone]  
1  
   
                          CO2 [Moles/Vol] 26 mmol/L                 20 - 31   
mmol/L                                  [facility] Phone:   
[phone]  
1  
   
                                                    Creatinine   
[Mass/Vol]          0.89 mg/dL                              0.70 - 1.20   
mg/dL                                   [facility] Phone:   
[phone]  
1  
   
                                                    Free PSA/Total PSA   
[Mass fraction]     7.6 g/dL                                6.4 - 8.3   
g/dL                                    [facility] Phone:   
[phone]  
1  
   
                      GFR  >60                   >60 mL/min [facility]  
Work Phone:   
[phone]  
1  
   
                                                    GFR Non-   
American        >60                             >60 mL/min      [facility] Phone:   
[phone]  
1  
   
                      Glucose [Mass/Vol] 92 mg/dL              70 - 99 mg/dL [facility] [employer]  
Work Phone:   
[phone]  
1  
   
                                                    Interpretation and   
review of laboratory   
results         Abnormal                                        [facility] Phone:   
[phone]  
1  
   
                                                    Potassium   
[Moles/Vol]         4.1 mmol/L                              3.7 - 5.3   
mmol/L                                  [facility] [employer] Phone:   
[phone]  
1  
   
                          Sodium [Moles/Vol] 137 mmol/L                135 - 144  
   
mmol/L                                  [facility] Phone:   
[phone]  
1  
   
                                                    Urea nitrogen (BldV)   
[Mass/Vol]      24 mg/dL        High            6 - 20 mg/dL    [facility] Phone:   
[phone]  
1  
   
                                                    Urea   
nitrogen/Creatinine   
(Bld) [Mass ratio] 27              High                            [facility] [employer] Phone:   
[phone]  
1  
   
                                                                  [facility] Phone:   
[phone]  
1  
   
                                                    EKG 12 LeadOrdered By: Marissa Bailey on 2021   
   
                      Atrial Rate 41                    BPM        [facility]  
Work Phone:   
[phone]  
1  
   
                      P Axis     50                    degrees    [facility] Phone:   
[phone]  
1  
   
                      P-R Interval 164 ms                           [facility] Phone:   
[phone]  
1  
   
                      Q-T Interval 514 ms                           [facility] Phone:   
[phone]  
1  
   
                      QRS Duration 102 ms                           [facility] [employer] Phone:   
[phone]  
1  
   
                                                    QTc Calculation   
(Bazett)        424 ms                                          [facility] Phone:   
[phone]  
1  
   
                      R Axis     55                    degrees    [facility] Phone:   
[phone]  
1  
   
                      T Axis     37                    degrees    [facility] Phone:   
[phone]  
1  
   
                      Ventricular Rate 41                    BPM        [employer] Phone:   
[phone]  
1  
   
                                                            Marked sinus bradyca  
rdia  
Possible Left atrial   
enlargement  
Abnormal ECG  
When compared with ECG   
of 2020 08:54,  
No significant change   
was found  
Confirmed by Milton Mckeon MD ([id number]) on 2021   
9:55:55 PM  
                                                            [facility]  
Work Phone:   
[phone]  
1  
   
                                                            Jg, Mhpn Incoming E  
kg   
Results From [facility] -   
2021 9:56 PM EDT   
Formatting of this note   
might be different from   
the original.  
Marked sinus bradycardia  
Possible Left atrial   
enlargement  
Abnormal ECG  
When compared with ECG   
of 2020 08:54,  
No significant change   
was found  
Confirmed by Mike PARIKH,   
Milton ([id number]) on 2021   
9:55:55 PM                                                  [facility] Phone:   
[phone]  
   
                                                                  [facility] Phone:   
[phone]  
   
                                                    Laboratory - Chemistry and C  
hemistry - challengeOrdered By: Beena Bailey   
on   
2021   
   
                                                    GFR/1.73 sq   
M.predicted MDRD   
(S/P/Bld) [Vol   
rate/Area]                                                      [facility] Phone:   
[phone]  
   
                                        Comment on above:   Average GFR for 40-4  
9 years old:  
99 mL/min/1.73sq m  
Chronic Kidney Disease:  
<60 mL/min/1.73sq m  
Kidney failure:  
<15 mL/min/1.73sq m  
  
  
eGFR calculated using average adult body mass. Additional eGFR   
calculator available at:  
  
http://www.[employer]/multiple_crcl_2012.htm  
  
  
   
   
                                                            Stage 1: Some kidney  
 damage normal GFR  
Stage 2: Mild kidney damage GFR 60-89  
Stage 3: Moderate kidney damage GFR 30-59  
Stage 4: Severe kidney damage GFR 15-29  
Stage 5: Severe kidney damage GFR <15  
ESRD - chronic treatment by dialysis or transplant  
  
  
   
   
                                                    Lipid Prof, Fastingon 2021   
   
                      Cholesterol,VLDL NOT REPORTED Normal            [facility]  
   
                                        Comment on above:   Performed By: #### C  
BC, OBN, CP ####  
[facility]  
[address] [place] [address] [phone]  
: Med Nieves MD  
#### PSAS, LIPRF ####  
[facility]  
[address] [place] [address] [phone]  
: Matty Monterroso MD   
   
                                                    Lipid, FastingOrdered By: Dorothy Bailey on 2021   
   
                                                    Cholesterol   
[Mass/Vol]      193 mg/dL                       <200            [facility] Phone:   
[phone]  
1  
   
                                        Comment on above:     
Cholesterol Guidelines:  
<200 Desirable  
200-240 Borderline  
>240 Undesirable  
  
  
   
   
                                                    Cholesterol in HDL   
[Mass/Vol]      90 mg/dL                        >40             [facility] Phone:   
[phone]  
1  
   
                                        Comment on above:     
HDL Guidelines:  
<40 Undesirable  
40-59 Borderline  
>59 Desirable  
  
  
   
   
                                                    Cholesterol in LDL   
[Mass/Vol]      97 mg/dL                        0 - 130 mg/dL   [facility] Phone:   
[phone]  
1  
   
                                        Comment on above:     
LDL Guidelines:  
<100 Desirable  
100-129 Near to/above Desirable  
130-159 Borderline  
>159 Undesirable  
  
Direct (measured) LDL and calculated LDL are not interchangeable   
tests.  
  
   
   
                                                    Cholesterol in VLDL   
[Mass/Vol]      NOT REPORTED                    1 - 30 mg/dL    [facility] Phone:   
[phone]  
1  
   
                                                    Cholesterol.total/Ch  
olesterol in HDL   
[Mass ratio]    2.1 {ratio}                     <5              [facility] Phone:   
[phone]  
1  
   
                                                    Triglyceride,   
Fasting         31 mg/dL                        <150            [facility] Phone:   
[phone]  
1  
   
                                        Comment on above:     
Triglyceride Guidelines:  
<150 Desirable  
150-199 Borderline  
200-499 High  
>499 Very high  
Based on AHA Guidelines for fasting triglyceride, 2012.  
  
  
   
   
                                                                  [facility] Phone:   
[phone]  
1  
   
                                                    Occult Blood, Fecalon 2021   
   
                      Occult Blood 1 Negative   Normal     NEG        [facility]  
   
                                        Comment on above:   Performed By: #### C  
BC, OBN, CP ####  
[facility]  
[address] [place] [address] [place] [address] [phone]  
: Med Nieves MD  
#### FARHAD, LIPRF ####  
[facility]  
[address] [place] [address] [phone]  
: Matty Monterroso MD   
   
                      Specimen 1 Date 21    Normal                [facility]  
   
                                        Comment on above:   Performed By: #### C  
BC, OBN, CP ####  
[facility]  
[address] [place] [address] [place] [address] [phone]  
: Med Nieves MD  
#### PSAS, LIPRF ####  
[address] [address]  
[phone]  
: Matty Monterroso MD   
   
                      Specimen 1 Time 0811       Normal                [facility]  
   
                                        Comment on above:   Performed By: #### C  
BC, OBN, CP ####  
[facility]  
[address] [place] [address] [place] [address]  
[phone]  
: Med Nieves MD  
#### PSAS, LIPRF ####  
[address] [address]  
[phone]  
: Matty Monterroso MD   
   
                      Occult Blood 2 NOT REPORTED Normal     NEG        [facility]  
   
                                        Comment on above:   Performed By: #### C  
BC OBN, CP ####  
[facility]  
[address] [place] [address] [place] [address]  
[phone]  
: Med Nieves MD  
#### PSAS, LIPRF ####  
[address] [address]  
[phone]  
: Mtaty Monterroso MD   
   
                      Occult Blood 3 NOT REPORTED Normal     NEG        [facility]  
   
                                        Comment on above:   Performed By: #### C  
BC, OBN, CP ####  
[facility]  
[address] [place] [address] [place] [address]  
[phone]  
: Med Nieves MD  
#### PSAS, LIPRF ####  
[address] [address]  
[phone]  
: Matty Monterroso MD   
   
                      Specimen 2 Date NOT REPORTED Normal                [facility]  
   
                                        Comment on above:   Performed By: #### C  
BC, OBN, CP ####  
[facility]  
[address] [place] [address] [place] [address]  
[phone]  
: Med Nieves MD  
#### PSAS, LIPRF ####  
[address] [address]  
[phone]  
: Matty Monterroso MD   
   
                      Specimen 2 Time NOT REPORTED Normal                [facility]  
   
                                        Comment on above:   Performed By: #### C  
BC, OBN, CP ####  
[facility]  
[address] [place], [place] [address] [phone]  
: Med Nieves MD  
#### PSAS, LIPRF ####  
[facility]  
[address] [place] [address]  
[phone]  
: Matty Monterroso MD   
   
                      Specimen 3 Date NOT REPORTED Normal                [facility]  
   
                                        Comment on above:   Performed By: #### C  
BC, OBN, CP ####  
[facility]  
[address] [place], [place] [address] [phone]  
: Med Nieves MD  
#### PSAS, LIPRF ####  
[address] [address]  
[phone]  
: Matty Monterroso MD   
   
                      Specimen 3 Time NOT REPORTED Normal                [facility]  
   
                                        Comment on above:   Performed By: #### C  
BC, OBN, CP ####  
[facility]  
[address] [place] [address] [address] [place], [place] [address] [phone]  
: Med Nieves MD  
#### PSAS, LIPRF ####  
[address] [address]  
[phone]  
: Matty Monterroso MD   
   
                                                    PSA screeningOrdered By: Aide Bailey on 2021   
   
                                                                  [facility]  
Work Phone:   
[phone]  
1  
   
                                                    Blood Occult Stool Screen #1  
on 2020   
   
                      Date, Stool #1 UNKNOWN                          [facility]  
th-   
[place], [place]  
   
                      Date, Stool #2 NOT REPORTED                       [facility]  
[facility]   
[place]  
   
                      Date, Stool #3 NOT REPORTED                       [facility]  
[place]   
[place]  
   
                                                    Hemoglobin.gastroint  
estinal spec 1 Ql   
(Stl)           Negative                        NEGATIVE        [facility]   
[place]  
   
                                                    Hemoglobin.gastroint  
estinal spec 2 Ql   
(Stl)           NOT REPORTED                    NEGATIVE        [facility]   
[place]  
   
                                                    Hemoglobin.gastroint  
estinal spec 3 Ql   
(Stl)           NOT REPORTED                    NEGATIVE        [facility]   
[place]  
   
                      Time, Stool #1 UNKNOWN                          [facility]  
[place]  
   
                      Time, Stool #2 NOT REPORTED                       [facility]  
[place]  
   
                      Time, Stool #3 NOT REPORTED                       [facility]  
[place]  
   
                                                    CBCon 2020   
   
                                                    Erythrocyte   
distribution width   
(RBC) [Ratio]   13.0 %                          11.8 - 14.4 %   [place]  
   
                                                    Hematocrit (Bld)   
[Volume fraction] 46.3 %                          40.7 - 50.3 %   [place]  
   
                                                    Hemoglobin (Bld)   
[Mass/Vol]      15.3 g/dL                       13 - 17 g/dL    [place]  
   
                                                    MCH (RBC) [Entitic   
mass]               31.6 pg                                 25.2 - 33.5   
pg                                      [place]  
   
                                                    MCHC (RBC)   
[Mass/Vol]          33.0 g/dL                               28.4 - 34.8   
g/dL                                    [place]  
   
                                                    MCV (RBC) [Entitic   
vol]                95.7 fL                                 82.6 - 102.9   
fL                                      [place]  
   
                                                    Platelet mean volume   
(Bld) [Entitic vol] 9.2 fL                          8.1 - 13.5 fL   [place]  
   
                                                    Platelets (Bld)   
[#/Vol]         330 10*3/uL                                     [place]  
   
                          RBC (Bld) [#/Vol] 4.84 10*6/uL              4.21 - 5.7  
7   
m/uL                                    [place]  
   
                          WBC (Bld) [#/Vol] 0.0 10*3/uL               0.0 per 10  
0   
WBC                                     [place]  
   
                      WBC (Bld) [#/Vol] 5.5 10*3/uL                       [place]  
   
                                                    Comprehensive Metabolic Pane  
eddie 2020   
   
                          Albumin [Mass/Vol] 4.8 g/dL                  3.5 - 5.2  
   
g/dL                                    [place]  
   
                                                    Albumin/Globulin   
[Mass ratio]    1.7 {ratio}                                     [place]  
   
                                                    ALP [Catalytic   
activity/Vol]   52 U/L                          40 - 129 U/L    [place]  
   
                                                    ALT [Catalytic   
activity/Vol]   27 U/L                          5 - 41 U/L      [place]  
   
                                                    Anion gap   
[Moles/Vol]     10 mmol/L                       9 - 17 mmol/L   [place]  
   
                                                    AST [Catalytic   
activity/Vol]   33 U/L                          <40             [place]  
   
                          Bilirubin Ql (U) 0.86 mg/dL                0.3 - 1.2   
mg/dL                                   [place]  
   
                      Bun/Cre Ratio 26         High                  [place]  
   
                          Calcium [Mass/Vol] 9.7 mg/dL                 8.6 - 10.  
4   
mg/dL                                   [place]  
   
                          Chloride [Moles/Vol] 98 mmol/L                 98 - 10  
7   
mmol/L                                  [place]  
   
                          CO2 [Moles/Vol] 26 mmol/L                 20 - 31   
mmol/L                                  [place]  
   
                                                    Creatinine   
[Mass/Vol]          0.8 mg/dL                               0.7 - 1.2   
mg/dL                                   [place]  
   
                      GFR  >60                   >60 mL/min [place]  
   
                                                    GFR Non-   
American        >60                             >60 mL/min      [place]  
   
                      Glucose [Mass/Vol] 97 mg/dL              70 - 99 mg/dL [place]  
   
                                                    Interpretation and   
review of laboratory   
results         Abnormal                                        [place]  
   
                                                    Potassium   
[Moles/Vol]         4.1 mmol/L                              3.7 - 5.3   
mmol/L                                  [place]  
   
                          Protein [Mass/Vol] 7.7 g/dL                  6.4 - 8.3  
   
g/dL                                    [place]  
   
                          Sodium [Moles/Vol] 134 mmol/L   Low          135 - 144  
   
mmol/L                                  [place]  
   
                                                    Urea nitrogen   
[Mass/Vol]      21 mg/dL        High            6 - 20 mg/dL    [place]  
   
                                                    Lipid Panelon 2020   
   
                                                    Cholesterol   
[Mass/Vol]      190 mg/dL                       <200            [place]  
   
                                        Comment on above:     
Cholesterol Guidelines:  
<200 Desirable  
200-240 Borderline  
>240 Undesirable  
  
  
   
   
                                                    Cholesterol in HDL   
[Mass/Vol]      96 mg/dL                        >40             [place]  
   
                                        Comment on above:     
HDL Guidelines:  
<40 Undesirable  
40-59 Borderline  
>59 Desirable  
  
  
   
   
                                                    Cholesterol in LDL   
[Mass/Vol]      87 mg/dL                        0 - 130 mg/dL   [place]  
   
                                        Comment on above:     
LDL Guidelines:  
<100 Desirable  
100-129 Near to/above Desirable  
130-159 Borderline  
>159 Undesirable  
  
Direct (measured) LDL and calculated LDL are not interchangeable   
tests.  
  
   
   
                                                    Cholesterol in VLDL   
[Mass/Vol]      NOT REPORTED                    1 - 30 mg/dL    [place]  
   
                                                    Cholesterol.total/Ch  
olesterol in HDL   
[Mass ratio]    2 {ratio}                       <5              [place]  
   
                                                    Triglyceride   
[Mass/Vol]      37 mg/dL                        <150            [place]  
   
                                        Comment on above:     
Triglyceride Guidelines:  
<150 Desirable  
150-199 Borderline  
200-499 High  
>499 Very high  
Based on AHA Guidelines for fasting triglyceride, 2012.  
  
  
   
   
                                                    Metabolic Panelon 2020  
   
   
                                                    GFR/1.73 sq M   
predicted among   
non-blacks MDRD   
(S/P/Bld) [Vol   
rate/Area]                                                      [place], [place]  
   
                                        Comment on above:   Stage 1: Some kidney  
 damage normal GFR  
Stage 2: Mild kidney damage GFR 60-89  
Stage 3: Moderate kidney damage GFR 30-59  
Stage 4: Severe kidney damage GFR 15-29  
Stage 5: Severe kidney damage GFR <15  
ESRD - chronic treatment by dialysis or transplant  
  
  
   
   
                                                            Average GFR for 40-4  
9 years old:  
99 mL/min/1.73sq m  
Chronic Kidney Disease:  
<60 mL/min/1.73sq m  
Kidney failure:  
<15 mL/min/1.73sq m  
  
  
eGFR calculated using average adult body mass. Additional eGFR   
calculator available at:  
  
http://www.[employer]/multiple_crcl_.htm  
  
  
   
  
  
  
Vital Signs  
  
  
                          Date Time    Vital Sign   Value        Performing   
Clinician                               Facility  
   
                                                    2024   
10:      Body height     177.8 cm                        [facility]  
   
                                                    2024   
10:040                              Body mass index   
(BMI) [Ratio]       28.8 kg/m2                              [facility]  
   
                                                    2024   
10:040      Body weight     91.17 kg                        [facility]  
   
                                                    2024   
10:040                              Diastolic blood   
pressure            86 mm[Hg]                               [facility]  
   
                                                    2024   
10:040      Heart rate      54 /min                         [facility]  
   
                                                    2024   
10:040      Respiratory rate 12 /min                         [facility]  
   
                                                    2024   
10:040                              Systolic blood   
pressure            127 mm[Hg]                              [facility]  
   
                                                    2023   
10:          Body height         177.8 cm            Kam Ball  
Other Phone:   
[phone]                           [facility]  
Other Phone:   
[phone]  
   
                                                    2023   
10:                              Body mass index   
(BMI) [Ratio]             28.89 kg/m2               Kam Garcia  
Other Phone:   
[phone]                           [facility]  
Other Phone:   
[phone]  
   
                                                    2023   
10:          Body weight         91.36 kg            Kam Garcia  
Other Phone:   
[phone]                           [facility]  
Other Phone:   
[phone]  
   
                                                    2023   
10:                              Diastolic blood   
pressure                  80 mm[Hg]                 Kam Garcia  
Other Phone:   
[phone]                           [facility]  
Other Phone:   
[phone]  
   
                                                    2023   
10:          Respiratory rate    12 /min             Kam Garcia  
Other Phone:   
[phone]                           [facility]  
Other Phone:   
[phone]  
   
                                                    2023   
10:                              Systolic blood   
pressure                  135 mm[Hg]                Kam Garcia  
Other Phone:   
[phone]                           [facility]  
Other Phone:   
[phone]  
  
  
  
Encounters  
  
  
                          Encounter Date Encounter Type Care Provider Facility  
   
                                                    Start: 2024  
End: 2024     ambulatory                              [facility]  
Work Phone:   
[phone]  
   
                                                    Start: 2024  
End: 2024                         Encounter for general   
adult medical   
examination without   
abnormal findings                                   [facility]  
   
                                                    Start: 2024  
End: 2024                         Patient encounter   
procedure                                           [facility]   
[facility]   
Clinic  
Work Phone:   
[phone]  
   
                          Start: 2024 Patient encounter status              
  [facility]  
   
                          Start: 2024 Non-patient / Non-visit               
 [facility]   
[facility]   
Professional Co  
Work Phone:   
[phone]  
   
                                                    Start: 11-  
End: 11-           ambulatory                Kam Garcia  
Other Phone:   
[phone]                           [facility]  
Other Phone:   
[phone]  
   
                          Start: 11- Telephone encounter Kam Garcia FP  
G Referral   
Coordinator  
   
                                                    Start: 2023  
End: 09-           ambulatory                Kam Garcia  
Other Phone:   
[phone]                           [facility]  
Other Phone:   
[phone]  
   
                          Start: 2023 Telephone encounter Kam Garcia   
[facility]   
Clinic  
   
                                                    Start: 2023  
End: 2023           ambulatory                Kam Garcia  
Other Phone:   
[phone]                           [facility]  
Other Phone:   
[phone]  
   
                                        Start: 2023   Encounter for genera  
l   
adult medical   
examination without   
abnormal findings         Kam Garcia             [facility]   
Clinic  
   
                                        Start: 2023   Periodic preventive   
med   
est patient 40-64yrs      Kam Jose             [facility]  
   
                                                    Start: 2023  
End: 2023           ambulatory                Kam Garcia  
Other Phone:   
[phone]                           [facility]  
Other Phone:   
[phone]  
   
                                        Start: 2023   Office outpatient vi  
sit   
15 minutes                Kam Garcia             [facility]  
   
                          Start: 10- ambulatory   DR KAM GARCIA Facili  
ty:H1  
   
                                                    Start: 2022  
End: 2022     ambulatory          [facility]  
l  
   
                                                    Start: 2022  
End: 2022     ambulatory          [facility]  
l  
   
                                                    Start: 2022  
End: 2022                         Subsequent hospital   
visit by physician                      Kam Garcia DO  
Work Phone:   
[phone]                          [facility]  
   
                                                    Start: 2022  
End: 2022     ambulatory          DR MEENAKSHI ELLIS     Facility:H1  
   
                          Start: 2022 ambulatory   DR MEENAKSHI ELLIS Facilit  
y:H1  
   
                                        Start: 2022   Encounter for   
preprocedural laboratory   
examination               DR MEENAKSHI ELLIS           [facility]  
   
                                                    Start: 2022  
End: 2022     ambulatory          DR MEENAKSHI ELLIS     Facility:H1  
   
                                                    Start: 2022  
End: 2022                         Encounter for   
preprocedural laboratory   
examination               DR MEENAKSHI ELLIS           Facility:H1  
   
                                                    Start: 2021  
End: 2021                         Emergency department   
patient visit             KAM GARCIA           [facility]  
   
                                                    Start: 2021  
End: 2021     ambulatory          DR KAM GARCIA    Facility:H1  
   
                                                    Start: 2021  
End: 2021     ambulatory          [facility]  
l  
   
                                                    Start: 2021  
End: 2021                         Subsequent hospital   
visit by physician                      [facility]   
Room                                    [facility]  
   
                                        Comment on above:   Pre-employment exami  
nation   
   
                                                    Start: 2021  
End: 2021     ambulatory          [facility]  
l  
   
                                                    Start: 2021  
End: 2021                         Subsequent hospital   
visit by physician                      Kam Garcia DO  
Work Phone:   
[phone]                          [facility]  
   
                                                    Start: 2021  
End: 2021     ambulatory          [facility]  
l  
   
                                                    Start: 2021  
End: 2021                         Subsequent hospital   
visit by physician                      Kam Garcia DO  
Work Phone:   
[phone]                          [facility]  
   
                                                    Start: 2020  
End: 2020                         Subsequent hospital   
visit by physician Kam Garcia             [facility]  
   
                                                    Start: 2020  
End: 2020                         Subsequent hospital   
visit by physician        Kam Garcia             [facility]  
   
                                                    Start: 2020  
End: 2020                         Subsequent hospital   
visit by physician        Kam Garcia             [facility]  
  
  
  
Procedures  
  
  
                          Date         Procedure    Procedure Detail Performing   
Clinician  
   
                                        Start: 2022   Ecg routine ecg w/le  
ast 12   
lds w/i&r                                           Beena Bailey APRN   
- CNP  
Work Phone:   
[phone]  
   
                          Start: 2021 PSA screening              Kam Garcia DO  
Work Phone:   
[phone]  
   
                                        Comment on above:   The Roche  ECLIA  as  
say is used. Results obtained with   
different   
assay methods cannot be  
used interchangeably.  
  
   
   
                                        Start: 2021   Ecg routine ecg w/le  
ast 12   
lds w/i&r                                           Beena Bailey APRN   
- CNP  
Work Phone:   
[phone]  
   
                                        Start: 2021   Comprehensive metabo  
lic   
panel                                               Beena Bailey APRN   
- CNP  
Work Phone:   
[phone]  
   
                          Start: 2021 Lipid panel               Beena dias APRN   
- CNP  
Work Phone:   
[phone]  
   
                                        Start: 2021   Virus centrifuge enh  
ncd id   
imfluor stain ea                                    Beena Bailey APRN   
- CNP  
Work Phone:   
[phone]  
   
                                        Start: 2020   BLOOD OCCULT STOOL S  
CREEN   
#1                                                  Beena Bailey  
Work Phone:   
[phone]  
   
                                        Start: 2020   Ecg routine ecg w/le  
ast 12   
lds w/i&r                                           Beena Bailey  
Work Phone:   
[phone]  
   
                                        Start: 2020   Blood count complete  
   
automated                                           Beena Bailey  
Work Phone:   
[phone]  
   
                                        Start: 2020   Comprehensive metabo  
lic   
panel                                               Beena Bailey  
Work Phone:   
[phone]  
   
                          Start: 2020 Lipid panel               Beena Eubanks  
[facility]  
Work Phone:   
[phone]  
  
  
  
Plan of Treatment  
  
  
                          Date         Care Activity Detail       Author  
   
                          Start: 2026 Lipid panel  Lipid screen [facility]  
   
                          Start: 2025 Lipid panel  Lipid screen [place], [place]  
   
                          Start: 2024 Lipid panel  Lipid screen [place]  
   
                                        Start: 2023   Screening for malign  
ant   
neoplasm of colon                                   [facility]  
   
                                Start: 2022 Influenza vaccination Flu vacc  
ine (Season   
Ended)                                  [facility]  
   
                                Start: 2021 Influenza vaccination Flu vacc  
ine (Season   
Ended)                                  [facility]  
Work Phone:   
[phone]  
   
                          Start: 2020 Influenza vaccination Flu vaccine (#  
1) [place], [place]  
   
                                        Start: 2017   Screening for malign  
ant   
neoplasm of colon                                   [facility]  
   
                                        Start: 1991   DTaP/Tdap/Td vaccine  
 (1 -   
Tdap)                                   DTaP/Tdap/Td vaccine (1   
- Tdap)                                 [facility]  
   
                          Start: 1987 HIV screening HIV screen   [facility]  
   
                          Start: 1984 COVID-19 Vaccine (1) COVID-19 Vaccin  
e (1) [facility]  
Work Phone:   
[phone]  
   
                          Start: 1984 Depression Screen Depression Screen   
[facility]  
   
                          Start: 1977 COVID-19 Vaccine (1) COVID-19 Vaccin  
e (1) [facility]  
   
                          Start: 1972 Hepatitis C screening Hepatitis C sc  
reen [facility]  
   
                                                EKG 12 Lead     EKG 12 Lead ECG   
Routine   
2020 8:54 AM EDT                  [place], [place]  
  
  
  
Payers  
  
  
                          Date         Payer Category Payer        Policy ID  
   
                          2022   Unknown                   [id number]   
[id number]  
[id number]  
   
                                2018      Unknown         [employer]  
[facility] [id number]   
2018-Present Indemnity              [id number]   
[id number]  
[id number]  
   
                          1972   Unknown                   [id number]   
[id number]  
   
                          1972   Unknown                   [id number]   
[id number]  
   
                          1972   Unknown                   [id number]   
[id number]  
   
                          1972   Unknown                   [id number]   
[id number]  
   
                          1972   Unknown                   [id number]   
[id number]  
   
                          1972   Unknown                   [id number]   
[id number]  
   
                          1972   Unknown                   [id number]   
[id number]  
   
                          1972   Unknown                   [id number]   
[id number]  
   
                          1972   Unknown                   [id number]   
[id number]  
   
                          1960   Unknown                   [id number]   
[id number]  
[id number]  
   
                          1960   Unknown                   [id number]  
   
                          1960   Unknown                   [id number]  
  
  
  
Social History  
  
  
                          Date         Type         Detail       Facility  
   
                                                    Start: 2015  
End: 2023                         Tobacco smoking   
status NHIS               Never smoker              [place], [place]  
   
                                                    Start: 2015  
End: 2021           Alcohol intake            Current drinker of   
alcohol (finding)                       [facility] ALANNAH VANESSA  
   
                          Start: 1970 Sex Assigned At Birth Not on file  M  
[facility] ALANNAH VANESSA  
   
                                                    Start: 2015  
End: 2021     Alcohol intake                          [facility]  
Work Phone:   
[phone]  
   
                                       Sex Assigned At Birth Sex Assigned At Bir  
th [facility]  
Other Phone:   
[phone]  
   
                          Start: 1970 Sex Assigned At Birth Male         F  
[facility]  
  
  
  
Clinical Notes 2021 to 2023  
  
  
                                Note Date & Type Note            Facility  
  
  
  
                                                    2023 Evaluation note   
  
  
  
                                                    Encounter   
Date                      Diagnosis                 Assessment   
Notes  
   
                                                    06 Sep,   
2023                                    Wellness   
examination   
(ICD-10 -   
Z00.00)                                             Healthy diet   
and exercise.   
Reviewed   
age-appropriat  
e preventive   
testing   
recommended.  
Work related   
labs and   
preventive   
testing   
completed.  
- asked to   
have him email   
to the office  
                                          
   
                                                    06 Sep,   
2023                                    Sprain of   
right rotator   
cuff capsule,   
initial   
encounter   
(ICD-10 -   
S43.421A)                                           ROM exercised,   
ice/heat and   
rest.  
Initiate   
NSAIDs  
Discussed PT,   
subacromial   
injection and   
referral to   
Ortho  
                                          
   
                                                    06 Sep,   
2023                                    HSV-2 (herpes   
simplex virus   
2) infection   
(ICD-10 -   
B00.9)                                              Continue   
suppressive   
dose of   
Valtrex, no   
recent   
outbreaks  
                                          
  
  
[facility]  
Other Phone: [phone]- Evaluation note*   
  
                      Encounter Date Diagnosis  Assessment Notes Treatment Notes  
 Treatment   
Clinical Notes  
   
                                        06 Sep, 2023        Wellness   
examination (ICD-10   
- Z00.00)                                           Healthy diet and   
exercise. Reviewed   
age-appropriate   
preventive testing   
recommended.  
Work related labs   
and preventive   
testing completed.  
- asked to have   
him email to the   
office  
                                          
   
                                        06 Sep, 2023        Sprain of right   
rotator cuff   
capsule, initial   
encounter (ICD-10 -   
S43.421A)                                           ROM exercised,   
ice/heat and rest.  
Initiate NSAIDs  
Discussed PT,   
subacromial   
injection and   
referral to Ortho  
                                          
   
                                        06 Sep, 2023        HSV-2 (herpes   
simplex virus 2)   
infection (ICD-10 -   
B00.9)                                              Continue   
suppressive dose   
of Valtrex, no   
recent outbreaks  
                                          
   
                                        06 Sep, 2023        Screening for colon   
cancer (ICD-10 -   
Z12.11)                                                       
  
  
[facility]  
Other Phone: [phone]- Evaluation note*   
  
                      Encounter Date Diagnosis  Assessment Notes Treatment Notes  
 Treatment   
Clinical Notes  
   
                                                Acute bronchitis   
due to other   
specified   
organisms (ICD-10   
- J20.8)                                            Instructed to use   
Robitussin or   
Mucinex for cough,   
saline or Flonase   
NS for congestion,   
Tylenol for pain   
and fever.  
                                          
   
                                                Allergic contact   
dermatitis due to   
plants, except   
food (ICD-10 -   
L23.7)                                              Cool compresses,   
allegra/benadryl  
                                          
  
  
[facility]  
Other Phone: [phone]- NoteOP Note  
PREOPERATIVE DIAGNOSIS: Left inguinal hernia.  
POSTOPERATIVE DIAGNOSIS: Direct left inguinal hernia and cord lipomas.  
PROCEDURE: Left inguinal herniorrhaphy with Bard mesh polypropylene patch.  
SURGEON: Meenakshi Ellis M.D.  
ANESTHESIA: General laryngeal mask airway.  
ESTIMATED BLOOD LOSS: Less than 10 mL.  
INDICATIONS AND CONSENT: Patient is a 51-year-old male, history of previous  
right inguinal hernia repair, who developed an acute left inguinal hernia per  
lifting a patient. On exam, the hernia is reducible. Indications, risks,  
benefits and alternatives of proceeding with herniorrhaphy with mesh insertion  
were explained extensively to the patient, including risks of bleeding,  
infection, scarring, pain, nerve injury, testicular injury, blood clot,  
pulmonary embolus, heart attack, anesthetic complications, need for further  
surgery, mesh removal. All of his questions were answered. Informed consent  
was obtained.  
PROCEDURE: Patient brought to the operating room, placed in the supine  
position. General anesthesia was induced. He was prepped and draped in the  
usual sterile fashion. Left groin incision was made in the area of the skin  
crease and carried down through subcutaneous tissue using sharp dissection as  
well as electrocautery. Xiao's fascia was divided. External oblique, which  
was attenuated, was opened along the direction of its fibers, down through the  
external inguinal ring. Cord structures were mobilized and retracted with the  
Penrose drain. There was a noted to be a direct hernia as well as cord lipomas.  
These were freed up from the cord structures and reduced back through the  
internal ring or through the floor of the inguinal canal respectively. The  
direct defect was then imbricated using 2-0 Prolene sutures. The enlargement  
of the internal ring was closed as well with additional 2-0 Prolene sutures.  
The wound was irrigated with good hemostasis. The Bard polypropylene patch was  
then trimmed. A keyhole was created. It was placed in the floor of the  
inguinal canal. The arms were placed around the cord structures. It was then  
secured circumferentially using interrupted 3-0 Vicryl sutures. Care was taken  
to avoid undo tension on the cord structures. The wound was irrigated with  
antibiotic saline. The external oblique was then closed with a running 3-0  
Vicryl suture. The subcutaneous tissues were infiltrated with Exparel solution.  
Xiao's fascia was then re-approximated using interrupted 3-0 Monocryl  
suture. The skin was then closed with a running 4-0 subcuticular Monocryl  
suture and skin glue. Sterile pressure dressing was applied. Sponge and  
needle counts were correct x2 per nursing personnel. Patient tolerated  
procedure, was extubated, sent to recovery room in good condition.  
CC: Dr. Hector Garcia  
IFC Signed and Approved by: DR MEENAKSHI ELLIS .  
2022 18:42:00[facility]06- History of Present illness 
Narrative* Veronica Woodall - 2021 11:00 AM EDT  
  
Formatting of this note might be different from the original.  
Instructed patient on the benefit and protocol of a stress test.  
  
  
  
Electronically signed by Veronica Woodall at 2021 10:55 AM EDT  
  
  
documented in this encounter[facility]  
Work Phone: [phone] note*   
  
                                                    Diagnosis  
   
                                                      
  
  
Pre-employment examination  
  
  
Health examination of defined subpopulation  
  
documented in this encounter  
[employer]  
Work Phone: [phone] noteNo [facility] [employer]  
Other Phone: [phone] note*   
  
                          Diagnosis    Onset Date   Resolution   Status  
   
                          Screening for colon cancer                           a  
cute  
   
                          Screening PSA (prostate specific antigen)               
              acute  
   
                          Wellness examination                           [facility]  
Work Phone: [phone] general Narrative - Reported*   
  
                                Type            Description     Date  
   
                                Medical History HSV-2 (herpes simplex virus 2) i  
nfection   
   
                                Medical History Hearing loss of both ears due to  
 cerumen impaction   
   
                                Medical History Body mass index (BMI) of 25.0 to  
 29.9   
   
                                Medical History Left upper quadrant abdominal pa  
in   
   
                                        Medical History     Inguinal hernia of r  
ight side without obstruction or   
gangrene                                  
   
                                        Medical History     Allergic contact tam  
matitis due to plants, except   
food                                      
   
                                Surgical History REPAIR, INGUINAL HERNIA, SLIDIN  
G   
   
                                Surgical History VASECTOMY         
   
                                Surgical History EXCISION INCLUSION CYST RIGHT S  
CAPULA 2004  
   
                                Hospitalization History SEE SURGICAL HX   
  
  
[employer]  
Other Phone: [phone] for referral (narrative)*   
  
                                        Reason              Referral for screeni  
ng colonoscopy  
   
                                        Diagnosis 1         Screening for colon   
cancer (Z12.11)  
   
                                        Referral Organization [facility]  
[facility]  
   
                                        Referring Provider First Name Kam  
   
                                        Referring Provider Last Name Jose  
   
                                        Referring Provider Specialty Internal Me  
dicine  
   
                                        Referred Organization [facility]  
   
                                        Referred Provider   Meenakshi Ellis  
   
                                        Referred Address    [address] [place], [place],[address]  
   
                                        Referred Provider Specialty Surgery  
   
                                        Referral Priority   Routine  
   
                                        General Notes       Mr. Mina is an as  
ymptomatic, low risk patient. He   
denies any family hx for polyps or colon cancer. He   
denies any change in appetite, weight or bowel habits.   
He denies heartburn or dysphagia. He denies abdominal   
pain, melena or hematochezia.  
  
  
[employer] [facility]  
Other Phone: [phone] for visit NarrativeGENERAL SURG REFERRAL UPDATE
[employer]  
Other Phone: [phone]  
  
Advance Directives  
                                Documents on File  
  
                          Type         Date Recorded Patient Representative Expl  
anation  
   
                          Advance Directives and Living Will                      
         
   
                          Power of                              
  
                                Documents on File  
  
                          Type         Date Recorded Patient Representative Expl  
anation  
   
                          ACP-Advance Directive                             
   
                          ACP-Power of                              
  
                                Documents on File  
  
                          Type         Date Recorded Patient Representative Expl  
anation  
   
                          ACP-Advance Directive                             
   
                          ACP-Power of                              
  
  
  
                                Advance Directive Response        Recorded Date/  
Time  
   
                                Advance Directives No               10:08am  
  
  
  
Reason for Referral  
  
  
                    Status    Reason    Specialty Diagnoses / Procedures Referre  
d By Contact Referred To   
Contact  
   
                          Closed                    Cardiology     
  
  
Diagnoses  
  
  
Pre-employment   
examination  
  
  
  
Procedures  
  
  
CARDIAC STRESS TEST   
EXERCISE ONLY                             
  
  
Beena Bailey APRN - CNP  
  
  
[address] [place] [address]  
  
  
Phone: [phone]  
  
  
Fax: [phone]                         
  
  
  
  
  
Summary Purpose  
  
  
                                                      
  
  
  
Family History  
  
  
                          Relationship Condition    Age at Onset Recorded Date/T  
amcy  
   
                          mother       Malignant neoplasm of breast Unknown       
   
   
                                       Malignant neoplasm Unknown        
   
                                       Diabetes mellitus Unknown        
  
  
  
Chief Complaint and Reason for Visit  
  
  
                                        Chief Complaint     bp concerns  
   
                                        Reason for Visit    Screening for colon   
cancer  
Screening PSA (prostate specific antigen)  
Wellness examination  
  
  
  
Additional Source Comments  
  
  
  
                                                    Reason for Visit (unrecogniz  
ed section and content)  
   
                                          
  
  
  
                    Status    Reason    Specialty Diagnoses / Procedures Referre  
d By Contact Referred To   
Contact  
   
                          Closed                    Cardiology     
  
  
Diagnoses  
  
  
Pre-employment   
examination  
  
  
  
Procedures  
  
  
CARDIAC STRESS TEST   
EXERCISE ONLY                             
  
  
Beena Bailey APRN - CNP  
  
  
[address] [place] [address]  
  
  
Phone: [phone]  
  
  
Fax: [phone]                         
  
  
  
  
  
  
  
                                                    Care Teams (unrecognized sec  
tion and content)  
   
                                          
  
  
  
                      Team Member Relationship Specialty  Start Date End Date  
   
                                                      
  
  
Kam Garcia DO  
  
  
[address] [facility] [place] [address] [phone] (Work)  
  
  
[phone] (Fax) PCP - General                   13           
  
  
  
                                                    Team Status: Active   
   
                          Member       Role         Status       Dates  
   
                          Kam Garcia DO Primary Care Provider Active         
  
  
  
                                                    Team Status: Active   
   
                          Member       Role         Status       Dates  
   
                                        Kam Jose   Primary Care Provide  
r,   
Attending Provider        Active                    Start: 2024  
  
  
  
  
                                                    Team Status: Inactive   
   
                          Member       Role         Status       Dates  
   
                                        Kam Garcia   Primary Care Provide  
r,   
Attending Provider        Active                    Start: 2024  
End: 2024  
  
  
  
  
  
                                                    PREGNANCY (unrecognized sect  
ion and content)  
   
                                                    No Pregnancy Status Records   
FoundNo Pregnancy Status Records FoundNo Pregnancy   
Status   
Records Found  
  
  
  
  
                                                    INFORMATION SOURCE (unrecogn  
ized section and content)  
   
                                          
  
  
  
                                        DATE CREATED        AUTHOR  
   
                                2022                      [facility]  
  
  
  
                                DATE CREATED    AUTHOR          AUTHOR'S ORGANIZ  
ATION  
   
                                10/11/2022                      [facility]  
[facility]  
  
  
  
                                DATE CREATED    AUTHOR          AUTHOR'S ORGANIZ  
ATION  
   
                                2023                      [facility]  
  
  
  
  
  
                                                    Goals (unrecognized section   
and content)  
   
                                                    Goals may be documented in a  
n alternate section  
  
FOR RECORDS PERTAINING TO PATIENTS WHO ARE OR HAVE BEEN ENROLLED IN A CHEMICAL 
DEPENDENCY/SUBSTANCEABUSE PROGRAM, SOME INFORMATION MAY BE OMITTED. This 
clinical summary was aggregated from multiple sources. Caution should be 
exercised in using it in the provision of clinical care. This summary normalizes
 information from multiple sources, and as a consequence, information in this 
document may materially change the coding, format and clinical context of 
patient data. In addition, data may be omitted in some cases. CLINICAL DECISIONS
 SHOULD BE BASED ON THE PRIMARY CLINICAL RECORDS. [employer] [facility]. provides
 no warranty or guarantee of the accuracy or completeness of information in this
 document.